# Patient Record
Sex: MALE | Race: OTHER | HISPANIC OR LATINO | URBAN - METROPOLITAN AREA
[De-identification: names, ages, dates, MRNs, and addresses within clinical notes are randomized per-mention and may not be internally consistent; named-entity substitution may affect disease eponyms.]

---

## 2019-05-14 ENCOUNTER — EMERGENCY (EMERGENCY)
Facility: HOSPITAL | Age: 29
LOS: 0 days | Discharge: HOME | End: 2019-05-14
Admitting: EMERGENCY MEDICINE
Payer: SELF-PAY

## 2019-05-14 VITALS
TEMPERATURE: 98 F | DIASTOLIC BLOOD PRESSURE: 67 MMHG | RESPIRATION RATE: 18 BRPM | SYSTOLIC BLOOD PRESSURE: 138 MMHG | OXYGEN SATURATION: 98 % | HEART RATE: 73 BPM

## 2019-05-14 DIAGNOSIS — M54.5 LOW BACK PAIN: ICD-10-CM

## 2019-05-14 PROCEDURE — 99283 EMERGENCY DEPT VISIT LOW MDM: CPT

## 2019-05-14 RX ORDER — IBUPROFEN 200 MG
600 TABLET ORAL ONCE
Refills: 0 | Status: COMPLETED | OUTPATIENT
Start: 2019-05-14 | End: 2019-05-14

## 2019-05-14 RX ORDER — METHOCARBAMOL 500 MG/1
1 TABLET, FILM COATED ORAL
Qty: 21 | Refills: 0
Start: 2019-05-14 | End: 2019-05-20

## 2019-05-14 RX ORDER — IBUPROFEN 200 MG
1 TABLET ORAL
Qty: 21 | Refills: 0
Start: 2019-05-14 | End: 2019-05-20

## 2019-05-14 RX ADMIN — Medication 600 MILLIGRAM(S): at 18:31

## 2019-05-14 NOTE — ED PROVIDER NOTE - PHYSICAL EXAMINATION
Gen: Alert, NAD, well appearing  Head: NC, AT, PERRL, EOMI, normal lids/conjunctiva  Neck: +supple, no tenderness/meningismus,  Pulm: Bilateral BS, normal resp effort, no wheeze/stridor/retractions  CV: RRR, no murmer  Abd: soft, NT/ND  Mskel: NT to palpation of back. +pain to left lower back with movement and changes in positions. +SLR on left leg. n/v intact. Normal gait in ED. No edema/erythema/cyanosis  Skin: no rash, warm/dry  Neuro: AAOx3, no sensory/motor deficits

## 2019-05-14 NOTE — ED PROVIDER NOTE - NSFOLLOWUPCLINICS_GEN_ALL_ED_FT
[facility] ([facility])  Rehabilitation  [facility] [address], [address] [place] [address]  Phone: [phone]  Fax:   Follow Up Time: 1-3 Days

## 2019-05-14 NOTE — ED PROVIDER NOTE - OBJECTIVE STATEMENT
29 yo M c/o waking up with left lower back pain today. Pain is "8", constant, worse with movement and changes in positions. Patient admits to lifting a lot while at work on Sunday. No abdominal pains, hematuria, dysuria, incontinence, weakness or numbness to legs.

## 2019-05-14 NOTE — ED PROVIDER NOTE - NS ED ROS FT
Review of Systems    Constitutional: (-) fever, (-) chills  Cardiovascular: (-) chest pain, (-) syncope  Respiratory: (-) cough, (-) shortness of breath  Gastrointestinal: (-) pain, (-) nausea, (-) vomiting, (-) diarrhea  Musculoskeletal: (-) neck pain, (+) back pain, (-) joint pain  Integumentary: (-) rash, (-) edema  Neurological: (-) headache, (-) altered mental status

## 2020-07-22 NOTE — ED ADULT TRIAGE NOTE - PRO INTERPRETER NEED 2
English
bed mobility training/strengthening/GOAL: Pt will be able to Negotiate up/down 10 steps, independently, w/ unilateral rail/and appropriate assistive device, w/reciprocal/step-to gait pattern, in 2 weeks./gait training/transfer training

## 2021-02-02 ENCOUNTER — EMERGENCY (EMERGENCY)
Facility: HOSPITAL | Age: 31
LOS: 0 days | Discharge: HOME | End: 2021-02-02
Attending: EMERGENCY MEDICINE | Admitting: EMERGENCY MEDICINE
Payer: MEDICAID

## 2021-02-02 VITALS
TEMPERATURE: 98 F | OXYGEN SATURATION: 100 % | DIASTOLIC BLOOD PRESSURE: 84 MMHG | SYSTOLIC BLOOD PRESSURE: 138 MMHG | HEART RATE: 85 BPM | RESPIRATION RATE: 18 BRPM

## 2021-02-02 DIAGNOSIS — R21 RASH AND OTHER NONSPECIFIC SKIN ERUPTION: ICD-10-CM

## 2021-02-02 DIAGNOSIS — R00.0 TACHYCARDIA, UNSPECIFIED: ICD-10-CM

## 2021-02-02 LAB
ALBUMIN SERPL ELPH-MCNC: 4.7 G/DL — SIGNIFICANT CHANGE UP (ref 3.5–5.2)
ALP SERPL-CCNC: 97 U/L — SIGNIFICANT CHANGE UP (ref 30–115)
ALT FLD-CCNC: 48 U/L — HIGH (ref 0–41)
AMYLASE P1 CFR SERPL: 43 U/L — SIGNIFICANT CHANGE UP (ref 25–115)
ANION GAP SERPL CALC-SCNC: 11 MMOL/L — SIGNIFICANT CHANGE UP (ref 7–14)
AST SERPL-CCNC: 26 U/L — SIGNIFICANT CHANGE UP (ref 0–41)
BASOPHILS # BLD AUTO: 0.03 K/UL — SIGNIFICANT CHANGE UP (ref 0–0.2)
BASOPHILS NFR BLD AUTO: 0.4 % — SIGNIFICANT CHANGE UP (ref 0–1)
BILIRUB SERPL-MCNC: 0.3 MG/DL — SIGNIFICANT CHANGE UP (ref 0.2–1.2)
BUN SERPL-MCNC: 11 MG/DL — SIGNIFICANT CHANGE UP (ref 10–20)
CALCIUM SERPL-MCNC: 9.4 MG/DL — SIGNIFICANT CHANGE UP (ref 8.5–10.1)
CHLORIDE SERPL-SCNC: 102 MMOL/L — SIGNIFICANT CHANGE UP (ref 98–110)
CO2 SERPL-SCNC: 28 MMOL/L — SIGNIFICANT CHANGE UP (ref 17–32)
CREAT SERPL-MCNC: 0.9 MG/DL — SIGNIFICANT CHANGE UP (ref 0.7–1.5)
EOSINOPHIL # BLD AUTO: 0.17 K/UL — SIGNIFICANT CHANGE UP (ref 0–0.7)
EOSINOPHIL NFR BLD AUTO: 2.1 % — SIGNIFICANT CHANGE UP (ref 0–8)
GLUCOSE SERPL-MCNC: 102 MG/DL — HIGH (ref 70–99)
HCT VFR BLD CALC: 46.2 % — SIGNIFICANT CHANGE UP (ref 42–52)
HGB BLD-MCNC: 15.6 G/DL — SIGNIFICANT CHANGE UP (ref 14–18)
IMM GRANULOCYTES NFR BLD AUTO: 0.2 % — SIGNIFICANT CHANGE UP (ref 0.1–0.3)
LYMPHOCYTES # BLD AUTO: 1.85 K/UL — SIGNIFICANT CHANGE UP (ref 1.2–3.4)
LYMPHOCYTES # BLD AUTO: 22.3 % — SIGNIFICANT CHANGE UP (ref 20.5–51.1)
MCHC RBC-ENTMCNC: 29.4 PG — SIGNIFICANT CHANGE UP (ref 27–31)
MCHC RBC-ENTMCNC: 33.8 G/DL — SIGNIFICANT CHANGE UP (ref 32–37)
MCV RBC AUTO: 87 FL — SIGNIFICANT CHANGE UP (ref 80–94)
MONOCYTES # BLD AUTO: 0.51 K/UL — SIGNIFICANT CHANGE UP (ref 0.1–0.6)
MONOCYTES NFR BLD AUTO: 6.2 % — SIGNIFICANT CHANGE UP (ref 1.7–9.3)
NEUTROPHILS # BLD AUTO: 5.7 K/UL — SIGNIFICANT CHANGE UP (ref 1.4–6.5)
NEUTROPHILS NFR BLD AUTO: 68.8 % — SIGNIFICANT CHANGE UP (ref 42.2–75.2)
NRBC # BLD: 0 /100 WBCS — SIGNIFICANT CHANGE UP (ref 0–0)
PLATELET # BLD AUTO: 245 K/UL — SIGNIFICANT CHANGE UP (ref 130–400)
POTASSIUM SERPL-MCNC: 4.4 MMOL/L — SIGNIFICANT CHANGE UP (ref 3.5–5)
POTASSIUM SERPL-SCNC: 4.4 MMOL/L — SIGNIFICANT CHANGE UP (ref 3.5–5)
PROT SERPL-MCNC: 7.2 G/DL — SIGNIFICANT CHANGE UP (ref 6–8)
RBC # BLD: 5.31 M/UL — SIGNIFICANT CHANGE UP (ref 4.7–6.1)
RBC # FLD: 12 % — SIGNIFICANT CHANGE UP (ref 11.5–14.5)
SODIUM SERPL-SCNC: 141 MMOL/L — SIGNIFICANT CHANGE UP (ref 135–146)
WBC # BLD: 8.28 K/UL — SIGNIFICANT CHANGE UP (ref 4.8–10.8)
WBC # FLD AUTO: 8.28 K/UL — SIGNIFICANT CHANGE UP (ref 4.8–10.8)

## 2021-02-02 PROCEDURE — 99284 EMERGENCY DEPT VISIT MOD MDM: CPT

## 2021-02-02 NOTE — ED PROVIDER NOTE - NS ED ROS FT
Constitutional: (-) fever  Eyes/ENT: (-) visual changes   Cardiovascular: (-) chest pain, (-) syncope  Respiratory: (-) cough, (-) shortness of breath  Gastrointestinal: (-) vomiting, (-) diarrhea  Genitourinary: (-) dysuria, (-) hesitancy, (-) frequency   Musculoskeletal: (-) neck pain, (-) back pain, (-) joint pain  Integumentary: (-) rash, (+) edema  Neurological: (-) headache, (-) altered mental status  Allergic/Immunologic: (-) pruritus

## 2021-02-02 NOTE — ED PROVIDER NOTE - NSFOLLOWUPINSTRUCTIONS_ED_ALL_ED_FT
Rash    CAN USE BENADRYL 25 MG EVERY 6 HOURS FOR ITCHING, RASH, SWELLING. CAN APPLY HYDROCORTISONE TO THE FOREHEAD RASH    A rash is a change in the color of the skin. A rash can also change the way your skin feels. There are many different conditions and factors that can cause a rash.    Follow these instructions at home:  Pay attention to any changes in your symptoms. Follow these instructions to help with your condition:    Medicine     Take or apply over-the-counter and prescription medicines only as told by your doctor. These may include:    Corticosteroid cream.  Anti-itch lotions.  Oral antihistamines.    Skin Care     Put cool compresses on the affected areas.  Try taking a bath with:    Epsom salts. Follow the instructions on the packaging. You can get these at your local pharmacy or grocery store.  Baking soda. Pour a small amount into the bath as told by your doctor.  Colloidal oatmeal. Follow the instructions on the packaging. You can get this at your local pharmacy or grocery store.    Try putting baking soda paste onto your skin. Stir water into baking soda until it gets like a paste.  Do not scratch or rub your skin.  Avoid covering the rash. Make sure the rash is exposed to air as much as possible.  General instructions     Avoid hot showers or baths, which can make itching worse. A cold shower may help.  Avoid scented soaps, detergents, and perfumes. Use gentle soaps, detergents, perfumes, and other cosmetic products.  Avoid anything that causes your rash. Keep a journal to help track what causes your rash. Write down:    What you eat.  What cosmetic products you use.  What you drink.  What you wear. This includes jewelry.    Keep all follow-up visits as told by your doctor. This is important.  Contact a doctor if:  You sweat at night.  You lose weight.  You pee (urinate) more than normal.  You feel weak.  You throw up (vomit).  Your skin or the whites of your eyes look yellow (jaundice).  Your skin:    Tingles.  Is numb.    Your rash:    Does not go away after a few days.  Gets worse.    You are:    More thirsty than normal.  More tired than normal.    You have:    New symptoms.  Pain in your belly (abdomen).  A fever.  Watery poop (diarrhea).    Get help right away if:  Your rash covers all or most of your body. The rash may or may not be painful.  You have blisters that:    Are on top of the rash.  Grow larger.  Grow together.  Are painful.  Are inside your nose or mouth.    You have a rash that:    Looks like purple pinprick-sized spots all over your body.  Has a “bull's eye” or looks like a target.  Is red and painful, causes your skin to peel, and is not from being in the sun too long.    This information is not intended to replace advice given to you by your health care provider. Make sure you discuss any questions you have with your health care provider.    Document Released: 06/05/2009 Document Revised: 05/25/2017 Document Reviewed: 05/04/2016  [employer] Interactive Patient Education © 2019 [employer]

## 2021-02-02 NOTE — ED PROVIDER NOTE - PATIENT PORTAL LINK FT
You can access the FollowMyHealth Patient Portal offered by [facility] by registering at the following website: [email or web address]. By joining [facility]’s FollowMyHealth portal, you will also be able to view your health information using other applications (apps) compatible with our system.

## 2021-02-02 NOTE — ED PROVIDER NOTE - CLINICAL SUMMARY MEDICAL DECISION MAKING FREE TEXT BOX
1 yo male with no PMH presents to the ER for swelling to right side of face and a localized swollen rash to the forehead since yesterday. States he noticed some forehead discomfort yesterday which then formed a small circular maculopapular rash about 2.5 cm in diameter. Pt then noticed  today redness and swelling to the lateral aspect of right face, anterior to the right ear area. Denies dental pain, no gum tenderness or swelling, no throat pain or itching, no SOB/CP/abdomen pain/N/V/D/Fever/chills. No rash to any other part of his body. Denies itching. No ear pain, no visual complaints, no neck pain/stiffness/numbness/weakness/back pain/leg swelling/wt loss. On exam pt has +small red maculopapular rash to the forehead (contact dermatitis?, allergic reaction?, ) and swelling/redness/pain to the right lateral face near parotid gland area. Pt eyes are clear, TMs clear, oropharynx clear no swelling or redness, rash to forehead, swelling to right cheek as described, no dental abscess or gingival edema/redness, no tenderness to percussion of the tooth, no cervical lymphadenopathy, no thyroid mass, Lungs ctab, heart regular s1s2, Abdomen soft nt/nd +BS, no masses, Ext no edema or calf tenderness, Neuro intact   Check cbc, check amylase, if neg, dc with referral to derm, take benadryl prn, and hydrocortisone cream for forehead rash

## 2021-02-02 NOTE — ED PROVIDER NOTE - PHYSICAL EXAMINATION
Gen: NAD, AOx3  Head: NCAT  HEENT: PERRL, oral mucosa moist, normal conjunctiva, oropharynx clear without exudate or erythema, R ear- erythema to canal w/ clear TM, edema to cheek w/ no TTP   Lung: CTAB, no respiratory distress, no wheezing, rales, rhonchi  CV: normal s1/s2, rrr, Normal perfusion, pulses 2+ throughout  Abd: soft, NTND, no CVA tenderness  MSK: No edema, no visible deformities, full range of motion in all 4 extremities  Neuro: No focal neurologic deficits  Skin: No rash   Psych: normal affect

## 2021-02-02 NOTE — ED PROVIDER NOTE - OBJECTIVE STATEMENT
29 yo male with no pertinent pmh presents c/o R facial edema for one day. 29 yo male with no pertinent pmh presents c/o R facial edema for one day. pt notes mild tenderness to edema and denies any hearing changes/cough/ear pain/visual changes. pt also mentions a lesion to his R forehead noted 3 days prior. denies any other symptoms including fevers, chill, headache, recent illness/travel, cough, abdominal pain, chest pain, or SOB.

## 2021-02-02 NOTE — ED PROVIDER NOTE - CARE PROVIDER_API CALL
Alex Coreas)  Dermatology; Internal Medicine  [address] [place] [address]  Phone: [phone]  Fax: [phone]  Follow Up Time: 1-3 Days

## 2023-01-31 ENCOUNTER — INPATIENT (INPATIENT)
Facility: HOSPITAL | Age: 33
LOS: 3 days | Discharge: HOME | End: 2023-02-04
Attending: INTERNAL MEDICINE | Admitting: INTERNAL MEDICINE
Payer: MEDICAID

## 2023-01-31 VITALS
SYSTOLIC BLOOD PRESSURE: 120 MMHG | OXYGEN SATURATION: 99 % | TEMPERATURE: 98 F | RESPIRATION RATE: 20 BRPM | DIASTOLIC BLOOD PRESSURE: 74 MMHG | HEART RATE: 133 BPM

## 2023-01-31 DIAGNOSIS — Z90.49 ACQUIRED ABSENCE OF OTHER SPECIFIED PARTS OF DIGESTIVE TRACT: Chronic | ICD-10-CM

## 2023-01-31 LAB
ALBUMIN SERPL ELPH-MCNC: 4.4 G/DL — SIGNIFICANT CHANGE UP (ref 3.5–5.2)
ALP SERPL-CCNC: 123 U/L — HIGH (ref 30–115)
ALT FLD-CCNC: 28 U/L — SIGNIFICANT CHANGE UP (ref 0–41)
ANION GAP SERPL CALC-SCNC: 10 MMOL/L — SIGNIFICANT CHANGE UP (ref 7–14)
ANION GAP SERPL CALC-SCNC: 8 MMOL/L — SIGNIFICANT CHANGE UP (ref 7–14)
AST SERPL-CCNC: 42 U/L — HIGH (ref 0–41)
BASOPHILS # BLD AUTO: 0.02 K/UL — SIGNIFICANT CHANGE UP (ref 0–0.2)
BASOPHILS NFR BLD AUTO: 0.2 % — SIGNIFICANT CHANGE UP (ref 0–1)
BILIRUB SERPL-MCNC: 1.2 MG/DL — SIGNIFICANT CHANGE UP (ref 0.2–1.2)
BUN SERPL-MCNC: 10 MG/DL — SIGNIFICANT CHANGE UP (ref 10–20)
BUN SERPL-MCNC: 11 MG/DL — SIGNIFICANT CHANGE UP (ref 10–20)
CALCIUM SERPL-MCNC: 9 MG/DL — SIGNIFICANT CHANGE UP (ref 8.4–10.5)
CALCIUM SERPL-MCNC: 9.6 MG/DL — SIGNIFICANT CHANGE UP (ref 8.4–10.5)
CHLORIDE SERPL-SCNC: 101 MMOL/L — SIGNIFICANT CHANGE UP (ref 98–110)
CHLORIDE SERPL-SCNC: 102 MMOL/L — SIGNIFICANT CHANGE UP (ref 98–110)
CO2 SERPL-SCNC: 26 MMOL/L — SIGNIFICANT CHANGE UP (ref 17–32)
CO2 SERPL-SCNC: 27 MMOL/L — SIGNIFICANT CHANGE UP (ref 17–32)
CREAT SERPL-MCNC: 1 MG/DL — SIGNIFICANT CHANGE UP (ref 0.7–1.5)
CREAT SERPL-MCNC: 1 MG/DL — SIGNIFICANT CHANGE UP (ref 0.7–1.5)
D DIMER BLD IA.RAPID-MCNC: 293 NG/ML DDU — HIGH
EGFR: 102 ML/MIN/1.73M2 — SIGNIFICANT CHANGE UP
EGFR: 102 ML/MIN/1.73M2 — SIGNIFICANT CHANGE UP
EOSINOPHIL # BLD AUTO: 0.04 K/UL — SIGNIFICANT CHANGE UP (ref 0–0.7)
EOSINOPHIL NFR BLD AUTO: 0.4 % — SIGNIFICANT CHANGE UP (ref 0–8)
GLUCOSE SERPL-MCNC: 110 MG/DL — HIGH (ref 70–99)
GLUCOSE SERPL-MCNC: 95 MG/DL — SIGNIFICANT CHANGE UP (ref 70–99)
HCT VFR BLD CALC: 44.4 % — SIGNIFICANT CHANGE UP (ref 42–52)
HGB BLD-MCNC: 14.6 G/DL — SIGNIFICANT CHANGE UP (ref 14–18)
IMM GRANULOCYTES NFR BLD AUTO: 0.2 % — SIGNIFICANT CHANGE UP (ref 0.1–0.3)
LYMPHOCYTES # BLD AUTO: 1.06 K/UL — LOW (ref 1.2–3.4)
LYMPHOCYTES # BLD AUTO: 11 % — LOW (ref 20.5–51.1)
MAGNESIUM SERPL-MCNC: 2.1 MG/DL — SIGNIFICANT CHANGE UP (ref 1.8–2.4)
MCHC RBC-ENTMCNC: 27.6 PG — SIGNIFICANT CHANGE UP (ref 27–31)
MCHC RBC-ENTMCNC: 32.9 G/DL — SIGNIFICANT CHANGE UP (ref 32–37)
MCV RBC AUTO: 83.9 FL — SIGNIFICANT CHANGE UP (ref 80–94)
MONOCYTES # BLD AUTO: 0.84 K/UL — HIGH (ref 0.1–0.6)
MONOCYTES NFR BLD AUTO: 8.7 % — SIGNIFICANT CHANGE UP (ref 1.7–9.3)
NEUTROPHILS # BLD AUTO: 7.63 K/UL — HIGH (ref 1.4–6.5)
NEUTROPHILS NFR BLD AUTO: 79.5 % — HIGH (ref 42.2–75.2)
NRBC # BLD: 0 /100 WBCS — SIGNIFICANT CHANGE UP (ref 0–0)
PLATELET # BLD AUTO: 251 K/UL — SIGNIFICANT CHANGE UP (ref 130–400)
POTASSIUM SERPL-MCNC: 4.1 MMOL/L — SIGNIFICANT CHANGE UP (ref 3.5–5)
POTASSIUM SERPL-MCNC: 5.8 MMOL/L — HIGH (ref 3.5–5)
POTASSIUM SERPL-SCNC: 4.1 MMOL/L — SIGNIFICANT CHANGE UP (ref 3.5–5)
POTASSIUM SERPL-SCNC: 5.8 MMOL/L — HIGH (ref 3.5–5)
PROT SERPL-MCNC: 7.4 G/DL — SIGNIFICANT CHANGE UP (ref 6–8)
RBC # BLD: 5.29 M/UL — SIGNIFICANT CHANGE UP (ref 4.7–6.1)
RBC # FLD: 13.2 % — SIGNIFICANT CHANGE UP (ref 11.5–14.5)
SODIUM SERPL-SCNC: 137 MMOL/L — SIGNIFICANT CHANGE UP (ref 135–146)
SODIUM SERPL-SCNC: 137 MMOL/L — SIGNIFICANT CHANGE UP (ref 135–146)
TROPONIN T SERPL-MCNC: <0.01 NG/ML — SIGNIFICANT CHANGE UP
WBC # BLD: 9.61 K/UL — SIGNIFICANT CHANGE UP (ref 4.8–10.8)
WBC # FLD AUTO: 9.61 K/UL — SIGNIFICANT CHANGE UP (ref 4.8–10.8)

## 2023-01-31 PROCEDURE — 71275 CT ANGIOGRAPHY CHEST: CPT | Mod: 26,MA

## 2023-01-31 PROCEDURE — 99285 EMERGENCY DEPT VISIT HI MDM: CPT

## 2023-01-31 PROCEDURE — 93010 ELECTROCARDIOGRAM REPORT: CPT | Mod: 77

## 2023-01-31 PROCEDURE — 93010 ELECTROCARDIOGRAM REPORT: CPT

## 2023-01-31 PROCEDURE — 71045 X-RAY EXAM CHEST 1 VIEW: CPT | Mod: 26

## 2023-01-31 RX ORDER — DILTIAZEM HCL 120 MG
30 CAPSULE, EXT RELEASE 24 HR ORAL EVERY 6 HOURS
Refills: 0 | Status: DISCONTINUED | OUTPATIENT
Start: 2023-01-31 | End: 2023-02-01

## 2023-01-31 RX ORDER — IBUPROFEN 200 MG
600 TABLET ORAL ONCE
Refills: 0 | Status: COMPLETED | OUTPATIENT
Start: 2023-01-31 | End: 2023-01-31

## 2023-01-31 RX ORDER — ACETAMINOPHEN 500 MG
650 TABLET ORAL EVERY 6 HOURS
Refills: 0 | Status: DISCONTINUED | OUTPATIENT
Start: 2023-01-31 | End: 2023-02-04

## 2023-01-31 RX ORDER — SODIUM CHLORIDE 9 MG/ML
1000 INJECTION, SOLUTION INTRAVENOUS ONCE
Refills: 0 | Status: COMPLETED | OUTPATIENT
Start: 2023-01-31 | End: 2023-01-31

## 2023-01-31 RX ORDER — ENOXAPARIN SODIUM 100 MG/ML
130 INJECTION SUBCUTANEOUS EVERY 12 HOURS
Refills: 0 | Status: DISCONTINUED | OUTPATIENT
Start: 2023-02-01 | End: 2023-02-01

## 2023-01-31 RX ORDER — ENOXAPARIN SODIUM 100 MG/ML
110 INJECTION SUBCUTANEOUS ONCE
Refills: 0 | Status: DISCONTINUED | OUTPATIENT
Start: 2023-01-31 | End: 2023-02-01

## 2023-01-31 RX ORDER — DILTIAZEM HCL 120 MG
15 CAPSULE, EXT RELEASE 24 HR ORAL ONCE
Refills: 0 | Status: COMPLETED | OUTPATIENT
Start: 2023-01-31 | End: 2023-01-31

## 2023-01-31 RX ADMIN — SODIUM CHLORIDE 1000 MILLILITER(S): 9 INJECTION, SOLUTION INTRAVENOUS at 13:45

## 2023-01-31 RX ADMIN — Medication 30 MILLIGRAM(S): at 20:00

## 2023-01-31 RX ADMIN — SODIUM CHLORIDE 1000 MILLILITER(S): 9 INJECTION, SOLUTION INTRAVENOUS at 16:57

## 2023-01-31 RX ADMIN — Medication 15 MILLIGRAM(S): at 19:29

## 2023-01-31 NOTE — H&P ADULT - NSICDXFAMILYHX_GEN_ALL_CORE_FT
FAMILY HISTORY:  Father  Still living? No  FH: ALS (amyotrophic lateral sclerosis), Age at diagnosis: Age Unknown    Sibling  Still living? No  FH: ALS (amyotrophic lateral sclerosis), Age at diagnosis: Age Unknown

## 2023-01-31 NOTE — ED PROVIDER NOTE - CLINICAL SUMMARY MEDICAL DECISION MAKING FREE TEXT BOX
Patient signed out to me from Dr. Alcocer, 32-year-old man with no significant PMHx, in the ER for evaluation after episode of lightheadedness, diaphoresis, chest discomfort this morning.  Patient asymptomatic in ER.  Patient tachycardic in ER, 's.  Initial EKG appeared to be sinus tach.  Labs reviewed: Troponin negative, + elevated D-dimer.  CTA chest negative for PE.  Patient admitted to telemetry for continued monitoring/cardiac evaluation.  Repeat EKG while patient still in ER showed a flutter @ 138.  Patient given Cardizem and Lovenox.  Case discussed with cardiology fellow, will come and eval. MAR aware as well.

## 2023-01-31 NOTE — ED PROVIDER NOTE - ATTENDING APP SHARED VISIT CONTRIBUTION OF CARE
32-year-old male without significant past medical history now presents with status post syncope at work.  As per patient his coworkers that he told him that he turned red and then pale and was very diaphoretic.  Patient denies any shortness of breath or chest pain no back pain or abdominal pain at this time.  No recent travel.    vss, tachycardic, no respiratory distress, no diaphoresis at this time nontoxic, well appearing, pink conj, anicteric, MMM, neck supple, CTAB, RRR, equal radial pulses bilat, abd soft/nt/nd, no cva tend. no calves tend, no edema, no fnd. no rashes.

## 2023-01-31 NOTE — H&P ADULT - HISTORY OF PRESENT ILLNESS
32M w/ no known PMH presenting s/p syncopal episode. Patient was in his usual state of health until earlier today when he suddenly developed lightheadedness, diaphoresis, sub-sternal chest discomfort, and generalized weakness. Symptoms have been intermittently occurring throughout day. No known aggravating or alleviating factors. Has never experienced anything similar before. No reported fevers, abdominal pain, n/v/d, dysuria, or LE swelling. No recent illnesses, sick contacts, changes in medications, or illicit/recreational drug use.    In ED, pt tachycardic (133) but otherwise HD stable on vitals. Labs showed D-Dimer 293 and initial troponin negative. EKG shows no acute ischemic changes. EKG officially read as NSR, but appears to be aflutter. CXR shows no acute cardiopulmonary disease. CTA Chest shows no acute PE. S/P 2L LR bolus, diltiazem 15mg IV x1, and Lovenox 110mg SQ x1. On telemetry, HR slowed down to 90's after cardizem and underlying aflutter became more obvious. Subsequently admitted to telemetry for new onset aflutter. 32M w/ no known PMH presenting s/p pre-syncopal episode. Patient was in his usual state of health until earlier today when he suddenly developed lightheadedness, diaphoresis, sub-sternal chest discomfort, and generalized weakness. No reported LOC or HT. Episode lasted for a few minutes and then spontaneously resolved, but recurrent episodes intermittently occurring throughout day. No known aggravating or alleviating factors. Has never experienced anything similar before. No reported fevers, abdominal pain, n/v/d, dysuria, or LE swelling. No recent illnesses, sick contacts, changes in medications, or illicit/recreational drug use.    In ED, pt tachycardic (133) but otherwise HD stable on vitals. Labs showed D-Dimer 293 and initial troponin negative. EKG shows no acute ischemic changes. EKG officially read as NSR, but appears to be aflutter. CXR shows no acute cardiopulmonary disease. CTA Chest shows no acute PE. S/P 2L LR bolus, diltiazem 15mg IV x1, and Lovenox 110mg SQ x1. On telemetry, HR slowed down to 90's after diltiazem and underlying aflutter became more obvious. Subsequently admitted to telemetry for new onset aflutter.

## 2023-01-31 NOTE — H&P ADULT - ASSESSMENT
32M w/ no known PMH presenting s/p syncopal episode and found to be persistently tachycardic. Admitted to telemetry for new onset aflutter.    #Chronic Atrial Flutter w/ RVR  Initially p/w pre-syncope. Tachycardic on admission ('s). Orthostatic vitals negative. D-Dimer 293 and Tn neg x2. No acute ischemic changes on EKG, but did show aflutter and possible q waves in inferior leads. CHADS-VASC >0.  - c/w diltiazem 30mg PO QID (titrate up by HR)  - c/w Lovenox 130mg SQ BID (can likely hold though given CHADS-VASC 0)  - admit to telemetry  - repeat EKG in AM  - repeat Tn in AM (Tn neg x2)  - check lipid panel, A1c, and TSH  - obtain TTE  - cardiology consulted; f/u recs     DVT PPX: Lovenox 130mg SQ BID  GI PPX: none indicated  DIET: Regular  ACTIVITY: IAT  CODE STATUS: Full Code  DISPOSITION: From Home    PENDING: cardiology consult 32M w/ no known PMH presenting s/p syncopal episode and found to be persistently tachycardic. Admitted to telemetry for new onset aflutter.    #Atrial Flutter w/ RVR, new onset  #ACS, r/o  Initially p/w pre-syncope. Tachycardic on admission ('s). Orthostatic vitals negative. D-Dimer 293 and Tn neg x2. No acute ischemic changes on EKG, but did show aflutter and possible q waves in inferior leads. CHADS-VASC >0. Will perform ACS w/u given pt's young age and possible presence of q waves on EKG.  - c/w diltiazem 30mg PO QID (titrate up by HR)  - c/w Lovenox 130mg SQ BID (can likely hold though given CHADS-VASC 0)  - admit to telemetry  - repeat EKG in AM  - repeat Tn in AM (Tn neg x2)  - check lipid panel, A1c, and TSH  - obtain TTE  - cardiology consulted; f/u recs     DVT PPX: Lovenox 130mg SQ BID  GI PPX: none indicated  DIET: Regular  ACTIVITY: IAT  CODE STATUS: Full Code  DISPOSITION: From Home    PENDING: cardiology consult 32M w/ no known PMH presenting s/p syncopal episode and found to be persistently tachycardic. Admitted to telemetry for new onset aflutter.    #Atrial Flutter w/ RVR, new onset  #ACS, r/o  Initially p/w pre-syncope. Tachycardic on admission ('s). Orthostatic vitals negative. D-Dimer 293 and Tn neg x2. No acute ischemic changes on EKG, but did show aflutter and possible q waves in inferior leads. CHADS-VASC >0. Will perform ACS w/u given pt's young age and possible presence of q waves on EKG. However, likely secondary to underlying untreated JANELLE/OHS.  - c/w diltiazem 30mg PO QID (titrate up by HR)  - c/w Lovenox 130mg SQ BID (switch to DOAC on discharge)  - admit to telemetry  - repeat EKG in AM  - repeat Tn in AM (Tn neg x2)  - check lipid panel, A1c, and TSH  - obtain TTE  - NPO after midnight for GURINDER/DCCV  - cardiology consulted; f/u final recs  - EP consulted; f/u recs    DVT PPX: Lovenox 130mg SQ BID  GI PPX: none indicated  DIET: Regular; NPO after midnight  ACTIVITY: IAT  CODE STATUS: Full Code  DISPOSITION: From Home    PENDING: cardiology consult; EP consult

## 2023-01-31 NOTE — ED PROVIDER NOTE - CARE PLAN
1 Principal Discharge DX:	Chest pain  Secondary Diagnosis:	Tachycardia  Secondary Diagnosis:	Near syncope

## 2023-01-31 NOTE — ED PROVIDER NOTE - OBJECTIVE STATEMENT
32 year old male, no past medical history, who presents for eval. patient was @ work this morning/afternoon when he began feeling lightheaded with associated diaphoresis and chest discomfort. patient with intermittent episodes of sx since morning, resolution of symptoms since arrival to ed. denies fever, chills, shortness of breath, hemoptysis, vomiting/diarrhea, headache, syncope. non-smoker, no family hx cad. no recent travel/surgery or hx blood clots.

## 2023-01-31 NOTE — ED PROVIDER NOTE - PROGRESS NOTE DETAILS
Note authored by Dr. Alcocer: Signed out to Dr. Goodwin, follow-up labs, reassess, and dispo Ramos: spoke with cards regarding ekg changes, +aflutter given cardizem/lovenox. MAR aware and will continue to follow.

## 2023-01-31 NOTE — ED ADULT NURSE NOTE - OBJECTIVE STATEMENT
pt presents to the ED c/o near syncopal episode at work. pt states he felt diaphoretic and had chest tightness which is now resolved. pt is placed on cardiac monitor and tachy to 130s, provider aware. pt denies n/v, vision changes, sob

## 2023-01-31 NOTE — H&P ADULT - NSHPPHYSICALEXAM_GEN_ALL_CORE
VITALS  T(C): 36.4 (01-31-23 @ 15:53), Max: 36.5 (01-31-23 @ 12:09)  HR: 131 (01-31-23 @ 19:44) (129 - 133)  BP: 113/82 (01-31-23 @ 19:44) (113/82 - 138/92)  RR: 20 (01-31-23 @ 18:23) (20 - 20)  SpO2: 100% (01-31-23 @ 18:23) (99% - 100%)    PHYSICAL EXAM  GENERAL: NAD, well-developed  NEURO: AO x3, PERRLA, EOMI, motor strength intact in 4/4 extremities, sensation intact  HEAD:  Atraumatic, Normocephalic  EYES: conjunctiva and sclera clear  NECK: Supple, No JVD, no lymphadenopathy, no thyromegaly  CHEST/LUNG: Clear to auscultation bilaterally; No wheezes, rales or rhonchi  HEART: tachycardic; irregular  ABDOMEN: Soft, Nontender, Nondistended; Bowel sounds present, no masses.  EXTREMITIES:  2+ Peripheral Pulses, No clubbing, cyanosis, or edema  SKIN: Warm, dry, intact, no rashes or lesions  PSYCH: affect appropriate

## 2023-01-31 NOTE — H&P ADULT - NSHPLABSRESULTS_GEN_ALL_CORE
CT Angio Chest PE Protocol w/ IV Cont (01.31.23 @ 16:43)  No evidence of acute pulmonary embolus or other acute intrathoracic pathology

## 2023-02-01 LAB
A1C WITH ESTIMATED AVERAGE GLUCOSE RESULT: 5.9 % — HIGH (ref 4–5.6)
ALBUMIN SERPL ELPH-MCNC: 4.5 G/DL — SIGNIFICANT CHANGE UP (ref 3.5–5.2)
ALP SERPL-CCNC: 123 U/L — HIGH (ref 30–115)
ALT FLD-CCNC: 22 U/L — SIGNIFICANT CHANGE UP (ref 0–41)
AMPHET UR-MCNC: NEGATIVE — SIGNIFICANT CHANGE UP
ANION GAP SERPL CALC-SCNC: 13 MMOL/L — SIGNIFICANT CHANGE UP (ref 7–14)
AST SERPL-CCNC: 17 U/L — SIGNIFICANT CHANGE UP (ref 0–41)
BARBITURATES UR SCN-MCNC: NEGATIVE — SIGNIFICANT CHANGE UP
BASOPHILS # BLD AUTO: 0.03 K/UL — SIGNIFICANT CHANGE UP (ref 0–0.2)
BASOPHILS NFR BLD AUTO: 0.4 % — SIGNIFICANT CHANGE UP (ref 0–1)
BENZODIAZ UR-MCNC: NEGATIVE — SIGNIFICANT CHANGE UP
BILIRUB SERPL-MCNC: 1.3 MG/DL — HIGH (ref 0.2–1.2)
BUN SERPL-MCNC: 9 MG/DL — LOW (ref 10–20)
CALCIUM SERPL-MCNC: 9.7 MG/DL — SIGNIFICANT CHANGE UP (ref 8.4–10.5)
CHLORIDE SERPL-SCNC: 105 MMOL/L — SIGNIFICANT CHANGE UP (ref 98–110)
CHOLEST SERPL-MCNC: 188 MG/DL — SIGNIFICANT CHANGE UP
CO2 SERPL-SCNC: 24 MMOL/L — SIGNIFICANT CHANGE UP (ref 17–32)
COCAINE METAB.OTHER UR-MCNC: NEGATIVE — SIGNIFICANT CHANGE UP
CREAT SERPL-MCNC: 1 MG/DL — SIGNIFICANT CHANGE UP (ref 0.7–1.5)
DRUG SCREEN 1, URINE RESULT: SIGNIFICANT CHANGE UP
EGFR: 102 ML/MIN/1.73M2 — SIGNIFICANT CHANGE UP
EOSINOPHIL # BLD AUTO: 0.16 K/UL — SIGNIFICANT CHANGE UP (ref 0–0.7)
EOSINOPHIL NFR BLD AUTO: 2 % — SIGNIFICANT CHANGE UP (ref 0–8)
ESTIMATED AVERAGE GLUCOSE: 123 MG/DL — HIGH (ref 68–114)
FENTANYL UR QL: NEGATIVE — SIGNIFICANT CHANGE UP
GLUCOSE SERPL-MCNC: 102 MG/DL — HIGH (ref 70–99)
HCT VFR BLD CALC: 47.3 % — SIGNIFICANT CHANGE UP (ref 42–52)
HDLC SERPL-MCNC: 42 MG/DL — SIGNIFICANT CHANGE UP
HGB BLD-MCNC: 15.4 G/DL — SIGNIFICANT CHANGE UP (ref 14–18)
IMM GRANULOCYTES NFR BLD AUTO: 0.3 % — SIGNIFICANT CHANGE UP (ref 0.1–0.3)
LIPID PNL WITH DIRECT LDL SERPL: 126 MG/DL — HIGH
LYMPHOCYTES # BLD AUTO: 2.05 K/UL — SIGNIFICANT CHANGE UP (ref 1.2–3.4)
LYMPHOCYTES # BLD AUTO: 25.9 % — SIGNIFICANT CHANGE UP (ref 20.5–51.1)
MAGNESIUM SERPL-MCNC: 2.1 MG/DL — SIGNIFICANT CHANGE UP (ref 1.8–2.4)
MCHC RBC-ENTMCNC: 27.6 PG — SIGNIFICANT CHANGE UP (ref 27–31)
MCHC RBC-ENTMCNC: 32.6 G/DL — SIGNIFICANT CHANGE UP (ref 32–37)
MCV RBC AUTO: 84.8 FL — SIGNIFICANT CHANGE UP (ref 80–94)
METHADONE UR-MCNC: NEGATIVE — SIGNIFICANT CHANGE UP
MONOCYTES # BLD AUTO: 0.61 K/UL — HIGH (ref 0.1–0.6)
MONOCYTES NFR BLD AUTO: 7.7 % — SIGNIFICANT CHANGE UP (ref 1.7–9.3)
NEUTROPHILS # BLD AUTO: 5.05 K/UL — SIGNIFICANT CHANGE UP (ref 1.4–6.5)
NEUTROPHILS NFR BLD AUTO: 63.7 % — SIGNIFICANT CHANGE UP (ref 42.2–75.2)
NON HDL CHOLESTEROL: 146 MG/DL — HIGH
NRBC # BLD: 0 /100 WBCS — SIGNIFICANT CHANGE UP (ref 0–0)
NT-PROBNP SERPL-SCNC: 2104 PG/ML — HIGH (ref 0–300)
OPIATES UR-MCNC: NEGATIVE — SIGNIFICANT CHANGE UP
OXYCODONE UR-MCNC: NEGATIVE — SIGNIFICANT CHANGE UP
PCP UR-MCNC: NEGATIVE — SIGNIFICANT CHANGE UP
PHOSPHATE SERPL-MCNC: 3.6 MG/DL — SIGNIFICANT CHANGE UP (ref 2.1–4.9)
PLATELET # BLD AUTO: 256 K/UL — SIGNIFICANT CHANGE UP (ref 130–400)
POTASSIUM SERPL-MCNC: 4.4 MMOL/L — SIGNIFICANT CHANGE UP (ref 3.5–5)
POTASSIUM SERPL-SCNC: 4.4 MMOL/L — SIGNIFICANT CHANGE UP (ref 3.5–5)
PROPOXYPHENE QUALITATIVE URINE RESULT: NEGATIVE — SIGNIFICANT CHANGE UP
PROT SERPL-MCNC: 7 G/DL — SIGNIFICANT CHANGE UP (ref 6–8)
RBC # BLD: 5.58 M/UL — SIGNIFICANT CHANGE UP (ref 4.7–6.1)
RBC # FLD: 13.4 % — SIGNIFICANT CHANGE UP (ref 11.5–14.5)
SARS-COV-2 RNA SPEC QL NAA+PROBE: SIGNIFICANT CHANGE UP
SODIUM SERPL-SCNC: 142 MMOL/L — SIGNIFICANT CHANGE UP (ref 135–146)
T4 FREE+ TSH PNL SERPL: 3.97 UIU/ML — SIGNIFICANT CHANGE UP (ref 0.27–4.2)
THC UR QL: NEGATIVE — SIGNIFICANT CHANGE UP
TRIGL SERPL-MCNC: 98 MG/DL — SIGNIFICANT CHANGE UP
TROPONIN T SERPL-MCNC: <0.01 NG/ML — SIGNIFICANT CHANGE UP
WBC # BLD: 7.92 K/UL — SIGNIFICANT CHANGE UP (ref 4.8–10.8)
WBC # FLD AUTO: 7.92 K/UL — SIGNIFICANT CHANGE UP (ref 4.8–10.8)

## 2023-02-01 PROCEDURE — 93320 DOPPLER ECHO COMPLETE: CPT | Mod: 26

## 2023-02-01 PROCEDURE — 93325 DOPPLER ECHO COLOR FLOW MAPG: CPT | Mod: 26

## 2023-02-01 PROCEDURE — 92960 CARDIOVERSION ELECTRIC EXT: CPT

## 2023-02-01 PROCEDURE — 93010 ELECTROCARDIOGRAM REPORT: CPT | Mod: 76

## 2023-02-01 PROCEDURE — 99222 1ST HOSP IP/OBS MODERATE 55: CPT

## 2023-02-01 PROCEDURE — 99223 1ST HOSP IP/OBS HIGH 75: CPT

## 2023-02-01 PROCEDURE — 99232 SBSQ HOSP IP/OBS MODERATE 35: CPT

## 2023-02-01 PROCEDURE — 93312 ECHO TRANSESOPHAGEAL: CPT | Mod: 26,XU

## 2023-02-01 RX ORDER — APIXABAN 2.5 MG/1
5 TABLET, FILM COATED ORAL EVERY 12 HOURS
Refills: 0 | Status: DISCONTINUED | OUTPATIENT
Start: 2023-02-01 | End: 2023-02-02

## 2023-02-01 RX ORDER — METOPROLOL TARTRATE 50 MG
25 TABLET ORAL
Refills: 0 | Status: DISCONTINUED | OUTPATIENT
Start: 2023-02-01 | End: 2023-02-04

## 2023-02-01 RX ADMIN — ENOXAPARIN SODIUM 130 MILLIGRAM(S): 100 INJECTION SUBCUTANEOUS at 05:09

## 2023-02-01 RX ADMIN — Medication 650 MILLIGRAM(S): at 05:11

## 2023-02-01 RX ADMIN — Medication 30 MILLIGRAM(S): at 00:46

## 2023-02-01 RX ADMIN — Medication 30 MILLIGRAM(S): at 05:09

## 2023-02-01 RX ADMIN — Medication 30 MILLIGRAM(S): at 11:41

## 2023-02-01 RX ADMIN — Medication 25 MILLIGRAM(S): at 18:02

## 2023-02-01 RX ADMIN — APIXABAN 5 MILLIGRAM(S): 2.5 TABLET, FILM COATED ORAL at 18:02

## 2023-02-01 NOTE — PROGRESS NOTE ADULT - SUBJECTIVE AND OBJECTIVE BOX
NICOLEJSESIE  32y, Male  Allergy: No Known Allergies    Hospital Day: 1d    Patient seen and examined earlier today.   no complaints, feeling some what better  cardiology following     PMH/PSH:  PAST MEDICAL & SURGICAL HISTORY:  No pertinent past medical history      S/P appendectomy          LAST 24-Hr EVENTS:    VITALS:  T(F): 98.1 (02-01-23 @ 08:05), Max: 98.1 (02-01-23 @ 08:05)  HR: 103 (02-01-23 @ 08:05)  BP: 139/93 (02-01-23 @ 08:05) (113/82 - 139/93)  RR: 20 (02-01-23 @ 08:05)  SpO2: 100% (02-01-23 @ 08:05)          TESTS & MEASUREMENTS:  Weight/BMI  133.8 (01-31-23 @ 18:23)                          15.4   7.92  )-----------( 256      ( 01 Feb 2023 07:48 )             47.3         02-01    142  |  105  |  9<L>  ----------------------------<  102<H>  4.4   |  24  |  1.0    Ca    9.7      01 Feb 2023 07:48  Phos  3.6     02-01  Mg     2.1     02-01    TPro  7.0  /  Alb  4.5  /  TBili  1.3<H>  /  DBili  x   /  AST  17  /  ALT  22  /  AlkPhos  123<H>  02-01    LIVER FUNCTIONS - ( 01 Feb 2023 07:48 )  Alb: 4.5 g/dL / Pro: 7.0 g/dL / ALK PHOS: 123 U/L / ALT: 22 U/L / AST: 17 U/L / GGT: x           CARDIAC MARKERS ( 01 Feb 2023 07:48 )  x     / <0.01 ng/mL / x     / x     / x      CARDIAC MARKERS ( 31 Jan 2023 21:52 )  x     / <0.01 ng/mL / x     / x     / x      CARDIAC MARKERS ( 31 Jan 2023 19:04 )  x     / <0.01 ng/mL / x     / x     / x      CARDIAC MARKERS ( 31 Jan 2023 14:45 )  x     / <0.01 ng/mL / x     / x     / x                D-Dimer Assay, Quantitative: 293 ng/mL DDU (01-31-23 @ 15:10)      Serum Pro-Brain Natriuretic Peptide: 2104 pg/mL (02-01-23 @ 07:48)    COVID-19 PCR: NotDetec (02-01-23 @ 07:40)        A1C with Estimated Average Glucose Result: 5.9 % (02-01-23 @ 07:48)          RADIOLOGY, ECG, & ADDITIONAL TESTS:  12 Lead ECG:   Ventricular Rate 138 BPM    Atrial Rate 276 BPM    QRS Duration 96 ms    Q-T Interval 378 ms    QTC Calculation(Bazett) 572 ms    P Axis 214 degrees    R Axis 67 degrees    T Axis 129 degrees    Diagnosis Line Atrial flutter with 2:1 A-V conduction  Marked ST abnormality, possible lateral subendocardial injury  Abnormal ECG    Confirmed by TRISHA DIAS [facility] ([id number]) on 2/1/2023 10:09:19 AM (01-31-23 @ 18:52)    CT Angio Chest PE Protocol w/ IV Cont:   ACC: [id number] EXAM:  CT ANGIO CHEST PULM [facility]   ORDERED BY: JOSEPH DE LA CRUZ     PROCEDURE DATE:  01/31/2023          INTERPRETATION:  CLINICAL INDICATION: Chest pain.    TECHNIQUE:  CTA of the thorax was performed after administration of   contrast per the PE protocol. Sagittal and coronal reformats were   performed as well as 3D reconstructions.  Intravenous contrast: 80 cc Omnipaque 350    COMPARISON: None.    INTERPRETATION:    PULMONARY ARTERIES: No central or segmental pulmonary emboli.    AIRWAYS/LUNGS/PLEURA: Mild bibasilar subsegmental atelectasis. No lobar   consolidation, pleural effusion or pneumothorax. Central tracheobronchial   tree is grossly patent.    MEDIASTINUM: Few subcentimeter and mildly enlarged mediastinal lymph   nodes are nonspecific.    HEART AND VESSELS: Heart size is within normal limits. Thoracic aorta and   pulmonary arteries are normal caliber. No pericardial effusion.    BONES AND SOFT TISSUES: Degenerative changes of the spine. Bilateral   gynecomastia.    IMPRESSION:    1. No evidence of acute pulmonary embolus or other acute intrathoracic   pathology.    --- End of Report ---          ABDI RICKETTS MD; Resident Radiologist  This document has been electronically signed.  SAGAR IQBAL MD; Attending Radiologist  This document has been electronically signed. Jan 31 2023  5:50PM (01-31-23 @ 16:43)    RECENT DIAGNOSTIC ORDERS:  Magnesium, Serum: AM Sched. Collection: 02-Feb-2023 04:30 (02-01-23 @ 09:21)  Comprehensive Metabolic Panel: AM Sched. Collection: 02-Feb-2023 04:30 (02-01-23 @ 09:21)  Complete Blood Count: AM Sched. Collection: 02-Feb-2023 04:30 (02-01-23 @ 09:21)  Diet, NPO:   NPO for Procedure/Test     NPO Start Date: 01-Feb-2023,   NPO Start Time: 05:00  Except Medications (02-01-23 @ 07:17)  TTE Echo Complete w/ Contrast w/ Doppler:   Transport: Stretcher-Crib  Monitor: w/o Monitor (01-31-23 @ 20:15)  Diet, Regular (01-31-23 @ 18:58)  Wet Read: Routine  WET READ: CXR negative - No pneumothorax, No opacities, No free air (01-31-23 @ 14:48)  Add On Test-Specimen in Lab: STAT, - d-dimer  Specimen In Lab. Enter name of test required in Special Instructions.  This is for informational purposes only and will not receive results. Laboratory staff will enter new orders for the requested add on test and that order will receive results. (01-31-23 @ 14:19)      MEDICATIONS:  MEDICATIONS  (STANDING):  diltiazem    Tablet 30 milliGRAM(s) Oral every 6 hours  enoxaparin Injectable 110 milliGRAM(s) SubCutaneous once  enoxaparin Injectable 130 milliGRAM(s) SubCutaneous every 12 hours    MEDICATIONS  (PRN):  acetaminophen     Tablet .. 650 milliGRAM(s) Oral every 6 hours PRN Temp greater or equal to 38C (100.4F), Mild Pain (1 - 3)      HOME MEDICATIONS:      PHYSICAL EXAM:  GENERAL: Patient lying on bed, comfortable   CHEST/LUNG: NVB, no wheezing   HEART: irregular   ABDOMEN: Soft. non tender, BS positive  EXTREMITIES:  no edema, Bruising  CNS: AAAx4. No cranial nerves deficit.

## 2023-02-01 NOTE — CONSULT NOTE ADULT - ASSESSMENT
32M w/ no known PMH presenting s/p pre-syncopal episode. Patient was found to be atrial flutter. patient admitted for further care and EPS consulted.      Impression   -Atrial flutter   -JANELLE non compliant with CPAP      Recommendations   -Telemetry monitor   -2d ECHO  -Mag above 2 and potassium above 4  -c/w cardizem 30mg q6 PO  -c/w Lovenox therapeutic dose for now, switch to Eliquis 5mg BID on discharge for 6 weeks   -GURINDER/ CV today   -F/u with EP as outpatient for ablation    32M w/ no known PMH presenting s/p pre-syncopal episode. Patient was found to be atrial flutter. patient admitted for further care and EPS consulted.      Impression   -Atrial flutter CHDASVASC of 0  -JANELLE non compliant with CPAP      Recommendations   -Telemetry monitor   -2d ECHO  -Mag above 2 and potassium above 4  -TSH  -c/w Cardizem 30mg q6 PO  -c/w Lovenox therapeutic dose for now, switch to Eliquis 5mg BID on discharge for 6 weeks   -GURINDER/ CV today   -F/u with EP as outpatient for ablation   -Counseled about [facility] compliance    32M w/ no known PMH presenting s/p pre-syncopal episode. Patient was found to be atrial flutter. patient admitted for further care and EPS consulted.      Impression   -Atrial flutter CHDASVASC of 0 s/p GURINDER and CV  -HFrEF per GURINDER, EF 20%  -JANELLE non compliant with CPAP      Recommendations   -Telemetry monitor   -2d ECHO after CV to reassess EF  -Mag above 2 and potassium above 4  -TSH  -c/w Cardizem 30mg q6 PO  -c/w Lovenox therapeutic dose for now, switch to Eliquis 5mg BID on discharge for 6 weeks   -Cardiology follow up   -F/u with EP as outpatient for ablation   -Counseled about cpap compliance    32M w/ no known PMH presenting s/p pre-syncopal episode. Patient was found to be atrial flutter. patient admitted for further care and EPS consulted.      Impression   -Atrial flutter CHDASVASC of 0 s/p GURINDER and CV  -HFrEF per GURINDER, EF 20%  -JANELLE non compliant with CPAP      Recommendations   -Telemetry monitor   -2d ECHO after CV to reassess EF  -Mag above 2 and potassium above 4  -TSH  -switch Cardizem to metoprolol succinate 25 daily given low EF  -c/w Lovenox therapeutic dose for now, switch to Eliquis 5mg BID on discharge for 6 weeks   -Cardiology follow up   -F/u with EP as outpatient for ablation   -Counseled about cpap compliance    32M w/ no known PMH presenting s/p pre-syncopal episode. Patient was found to be atrial flutter. patient admitted for further care and EPS consulted.      Impression   -Atrial flutter CHDASVASC of 1 s/p GURINDER and CV  -New onset Cardiomyopathy / HFrEF per GURINDER, EF 20%  -JANELLE non compliant with CPAP      Recommendations   -Telemetry monitor   -2d ECHO after CV to reassess EF  -CCTA to assess coronary arteries   -Mag above 2 and potassium above 4  -TSH  -switch Cardizem to metoprolol succinate 25 daily given low EF  - switch to Eliquis 5mg BID on discharge for 6 weeks   -Cardiology follow up   -F/u with EP as outpatient for ablation   -Counseled about cpap compliance    32M w/ no known PMH presenting s/p pre-syncopal episode. Patient was found to be atrial flutter. patient admitted for further care and EPS consulted.      Impression   -Atrial flutter CHDASVASC of 1 s/p GURINDER and CV  -New onset Cardiomyopathy / HFrEF per GURINDER, EF 20%  -JANELLE non compliant with CPAP      Recommendations   -Telemetry monitor   -2d ECHO after CV to reassess EF  -CCTA to assess coronary arteries   -Mag above 2 and potassium above 4  -TSH  -switch Cardizem to metoprolol BID vs TID to titrate HR around 60 for the CCTA  - switch to Eliquis 5mg BID on discharge for 6 weeks   -Cardiology follow up for GDMT  -F/u with EP as outpatient for ablation   -Counseled about cpap compliance    32M w/ no known PMH presenting s/p pre-syncopal episode. Patient was found to be atrial flutter. patient admitted for further care and EPS consulted.      Impression   -Atrial flutter CHDASVASC of 1 s/p GURINDER and CV  -New onset Cardiomyopathy / HFrEF per GURINDER, EF 20%  -JANELLE non compliant with CPAP      Recommendations   -Telemetry monitor   -2d ECHO after CV to reassess EF  -CCTA to assess coronary arteries   -Mag above 2 and potassium above 4  -TSH  -switch Cardizem to metoprolol BID vs TID to titrate HR around 60 for the CCTA  - switch to Eliquis 5mg BID on discharge  -Cardiology follow up for GDMT  -F/u with EP as outpatient for ablation   -Counseled about cpap compliance

## 2023-02-01 NOTE — CONSULT NOTE ADULT - ASSESSMENT
IMPRESSION  Dizziness from New Onset Atrial Flutter with RVR  JANELLE non compliant with CPAP  Obesity    RECOMMENDATIONS  telemonitoring  check TSH, TTE, urine tox  c/w cardizem 30mg q6 PO  c/w Lovenox therapeutic dose for now, switch to Eliquis 5mg BID on discharge for 6 weeks   keep NPO for GURINDER/CV today 2/1/23  outpatient EP eval for possible ablation  counselled on weight loss and CPAP compliance  IMPRESSION  Dizziness from New Onset Atrial Flutter with RVR  JANELLE non compliant with CPAP  Obesity    RECOMMENDATIONS  telemonitoring  check TSH, TTE, urine tox  c/w cardizem 30mg q6 PO  c/w Lovenox therapeutic dose for now, switch to Eliquis 5mg BID on discharge for 6 weeks   Please get stat COVID-19 PCR  keep NPO for GURINDER/CV today 2/1/23  outpatient EP eval for possible ablation  counselled on weight loss and CPAP compliance  IMPRESSION  Dizziness from New Onset Atrial Flutter with RVR  JANELLE non compliant with CPAP  Obesity    RECOMMENDATIONS  telemonitoring  check TSH, TTE, urine tox  c/w cardizem 30mg q6 PO  c/w Lovenox therapeutic dose for now, switch to Eliquis 5mg BID on discharge for 6 weeks   Please get stat COVID-19 PCR  keep NPO for GURINDER/CV today 2/1/23  EP eval for possible ablation  counselled on weight loss and CPAP compliance

## 2023-02-01 NOTE — CONSULT NOTE ADULT - SUBJECTIVE AND OBJECTIVE BOX
HPI:  32M w/ no known PMH presenting s/p pre-syncopal episode. Patient was in his usual state of health until earlier today when he suddenly developed lightheadedness, diaphoresis, sub-sternal chest discomfort, and generalized weakness. No reported LOC or HT. Episode lasted for a few minutes and then spontaneously resolved, but recurrent episodes intermittently occurring throughout day. No known aggravating or alleviating factors. Has never experienced anything similar before. No reported fevers, abdominal pain, n/v/d, dysuria, or LE swelling. No recent illnesses, sick contacts, changes in medications, or illicit/recreational drug use.  In ED, pt tachycardic (133) but otherwise HD stable on vitals. Labs showed D-Dimer 293 and initial troponin negative.  CXR shows no acute cardiopulmonary disease. CTA Chest shows no acute PE. S/P 2L LR bolus, diltiazem 15mg IV x1, and Lovenox 110mg SQ x1. On telemetry, HR slowed down to 90's after diltiazem and underlying aflutter became more obvious. Subsequently admitted to telemetry for new onset aflutter. (2023 20:58)  EP consulted for further care.       PAST MEDICAL & SURGICAL HISTORY  No pertinent past medical history    S/P appendectomy        FAMILY HISTORY:  FAMILY HISTORY:  FH: ALS (amyotrophic lateral sclerosis) (Father, Sibling)        SOCIAL HISTORY:  []smoker  []Alcohol  []Drug    ALLERGIES:  No Known Allergies      MEDICATIONS:  MEDICATIONS  (STANDING):  diltiazem    Tablet 30 milliGRAM(s) Oral every 6 hours  enoxaparin Injectable 110 milliGRAM(s) SubCutaneous once  enoxaparin Injectable 130 milliGRAM(s) SubCutaneous every 12 hours    MEDICATIONS  (PRN):  acetaminophen     Tablet .. 650 milliGRAM(s) Oral every 6 hours PRN Temp greater or equal to 38C (100.4F), Mild Pain (1 - 3)      HOME MEDICATIONS:  Home Medications:      VITALS:   T(F): 98.1 ( @ 08:05), Max: 98.1 ( @ 08:05)  HR: 103 ( @ 08:05) (103 - 138)  BP: 139/93 ( @ 08:05) (113/82 - 139/93)  BP(mean): --  RR: 20 ( @ 08:05) (20 - 20)  SpO2: 100% ( @ 08:05) (99% - 100%)    I&O's Summary      REVIEW OF SYSTEMS:  CONSTITUTIONAL: No weakness, fevers or chills  EYES: No visual changes  ENT: No vertigo or throat pain   NECK: No pain or stiffness  RESPIRATORY: No cough, wheezing, hemoptysis; No shortness of breath  CARDIOVASCULAR: as per HPI  GASTROINTESTINAL: No abdominal or epigastric pain. No nausea, vomiting, or hematemesis; No diarrhea or constipation. No melena or hematochezia.  GENITOURINARY: No dysuria, frequency or hematuria  NEUROLOGICAL: No numbness or weakness  SKIN: No itching, no rashes  MSK: No pain    PHYSICAL EXAM:  NEURO: patient is awake , alert and oriented  GEN: Not in acute distress  NECK: no thyroid enlargement, no JVD  LUNGS: Clear to auscultation bilaterally   CARDIOVASCULAR: S1/S2 present, RRR , no murmurs or rubs, no carotid bruits,  + PP bilaterally  ABD: Soft, non-tender, non-distended, +BS  EXT: No RODRIGUE  SKIN: Intact    LABS:                        15.4   7.92  )-----------( 256      ( 2023 07:48 )             47.3         142  |  105  |  9<L>  ----------------------------<  102<H>  4.4   |  24  |  1.0    Ca    9.7      2023 07:48  Phos  3.6       Mg     2.1         TPro  7.0  /  Alb  4.5  /  TBili  1.3<H>  /  DBili  x   /  AST  17  /  ALT  22  /  AlkPhos  123<H>        Troponin T, Serum: <0.01 ng/mL (23 @ 07:48)  Troponin T, Serum: <0.01 ng/mL (23 @ 21:52)  Troponin T, Serum: <0.01 ng/mL (23 @ 19:04)  Troponin T, Serum: <0.01 ng/mL (23 @ 14:45)    CARDIAC MARKERS ( 2023 07:48 )  x     / <0.01 ng/mL / x     / x     / x      CARDIAC MARKERS ( 2023 21:52 )  x     / <0.01 ng/mL / x     / x     / x      CARDIAC MARKERS ( 2023 19:04 )  x     / <0.01 ng/mL / x     / x     / x      CARDIAC MARKERS ( 2023 14:45 )  x     / <0.01 ng/mL / x     / x     / x            Troponin trend:    Serum Pro-Brain Natriuretic Peptide: 2104 pg/mL (23 @ 07:48)     Chol 188 LDL -- HDL 42 Trig 98      RADIOLOGY:  -CXR:    < from: CT Angio Chest PE Protocol w/ IV Cont (23 @ 16:43) >  IMPRESSION:    1. No evidence of acute pulmonary embolus or other acute intrathoracic   pathology.    --- End of Report ---    < end of copied text >      EC Lead ECG:   Ventricular Rate 135 BPM    Atrial Rate 135 BPM    P-R Interval 200 ms    QRS Duration 94 ms    Q-T Interval 186 ms    QTC Calculation(Bazett) 279 ms    P Axis 41 degrees    R Axis 68 degrees    T Axis 89 degrees    Diagnosis Line Sinus tachycardia  Possible Left atrial enlargement  Inferior infarct , age undetermined  Abnormal ECG    Confirmed by Wil Mckee ([id number]) on 2023 4:16:28 PM (23 @ 12:11)         HPI:  32M w/ no known PMH presenting s/p pre-syncopal episode. Patient was in his usual state of health until earlier today when he suddenly developed lightheadedness, diaphoresis, sub-sternal chest discomfort, and generalized weakness. No reported LOC or HT. Episode lasted for a few minutes and then spontaneously resolved, but recurrent episodes intermittently occurring throughout day. No known aggravating or alleviating factors. Has never experienced anything similar before. No reported fevers, abdominal pain, n/v/d, dysuria, or LE swelling. No recent illnesses, sick contacts, changes in medications, or illicit/recreational drug use.  In ED, pt tachycardic (133) but otherwise HD stable on vitals. Labs showed D-Dimer 293 and initial troponin negative.  CXR shows no acute cardiopulmonary disease. CTA Chest shows no acute PE. S/P 2L LR bolus, diltiazem 15mg IV x1, and Lovenox 110mg SQ x1. On telemetry, HR slowed down to 90's after diltiazem and underlying aflutter became more obvious. Subsequently admitted to telemetry for new onset aflutter. (2023 20:58)    EP addendum   EP consulted for atrial flutter. patient with PMHx of JANELLE not compliant with CPAP. states he started to have chest pain, SOB and palpitations. no hx of syncope or SCD.   currently rate controlled on Cardizem PO      PAST MEDICAL & SURGICAL HISTORY  No pertinent past medical history    S/P appendectomy        FAMILY HISTORY:  FAMILY HISTORY:  FH: ALS (amyotrophic lateral sclerosis) (Father, Sibling)        SOCIAL HISTORY:  []smoker  []Alcohol  []Drug    ALLERGIES:  No Known Allergies      MEDICATIONS:  MEDICATIONS  (STANDING):  diltiazem    Tablet 30 milliGRAM(s) Oral every 6 hours  enoxaparin Injectable 110 milliGRAM(s) SubCutaneous once  enoxaparin Injectable 130 milliGRAM(s) SubCutaneous every 12 hours    MEDICATIONS  (PRN):  acetaminophen     Tablet .. 650 milliGRAM(s) Oral every 6 hours PRN Temp greater or equal to 38C (100.4F), Mild Pain (1 - 3)      HOME MEDICATIONS:  Home Medications:      VITALS:   T(F): 98.1 ( @ 08:05), Max: 98.1 ( @ 08:05)  HR: 103 ( @ 08:05) (103 - 138)  BP: 139/93 ( @ 08:05) (113/82 - 139/93)  BP(mean): --  RR: 20 ( @ 08:05) (20 - 20)  SpO2: 100% ( @ 08:05) (99% - 100%)    I&O's Summary      REVIEW OF SYSTEMS:  CONSTITUTIONAL: No weakness, fevers or chills  EYES: No visual changes  ENT: No vertigo or throat pain   NECK: No pain or stiffness  RESPIRATORY: No cough, wheezing, hemoptysis; No shortness of breath  CARDIOVASCULAR: as per HPI  GASTROINTESTINAL: No abdominal or epigastric pain. No nausea, vomiting, or hematemesis; No diarrhea or constipation. No melena or hematochezia.  GENITOURINARY: No dysuria, frequency or hematuria  NEUROLOGICAL: No numbness or weakness  SKIN: No itching, no rashes  MSK: No pain    PHYSICAL EXAM:  NEURO: patient is awake , alert and oriented  GEN: Not in acute distress  NECK: no thyroid enlargement, no JVD  LUNGS: Clear to auscultation bilaterally   CARDIOVASCULAR: S1/S2 present, RRR , no murmurs or rubs, no carotid bruits,  + PP bilaterally  ABD: Soft, non-tender, non-distended, +BS  EXT: No RODRIGUE  SKIN: Intact    LABS:                        15.4   7.92  )-----------( 256      ( 2023 07:48 )             47.3         142  |  105  |  9<L>  ----------------------------<  102<H>  4.4   |  24  |  1.0    Ca    9.7      2023 07:48  Phos  3.6       Mg     2.1         TPro  7.0  /  Alb  4.5  /  TBili  1.3<H>  /  DBili  x   /  AST  17  /  ALT  22  /  AlkPhos  123<H>        Troponin T, Serum: <0.01 ng/mL (23 @ 07:48)  Troponin T, Serum: <0.01 ng/mL (23 @ 21:52)  Troponin T, Serum: <0.01 ng/mL (23 @ 19:04)  Troponin T, Serum: <0.01 ng/mL (23 @ 14:45)    CARDIAC MARKERS ( 2023 07:48 )  x     / <0.01 ng/mL / x     / x     / x      CARDIAC MARKERS ( 2023 21:52 )  x     / <0.01 ng/mL / x     / x     / x      CARDIAC MARKERS ( 2023 19:04 )  x     / <0.01 ng/mL / x     / x     / x      CARDIAC MARKERS ( 2023 14:45 )  x     / <0.01 ng/mL / x     / x     / x            Troponin trend:    Serum Pro-Brain Natriuretic Peptide: 2104 pg/mL (23 @ 07:48)    02 Chol 188 LDL -- HDL 42 Trig 98      RADIOLOGY:  -CXR:    < from: CT Angio Chest PE Protocol w/ IV Cont (23 @ 16:43) >  IMPRESSION:    1. No evidence of acute pulmonary embolus or other acute intrathoracic   pathology.    --- End of Report ---    < end of copied text >      EC Lead ECG:   Ventricular Rate 135 BPM    Atrial Rate 135 BPM    P-R Interval 200 ms    QRS Duration 94 ms    Q-T Interval 186 ms    QTC Calculation(Bazett) 279 ms    P Axis 41 degrees    R Axis 68 degrees    T Axis 89 degrees    Diagnosis Line Sinus tachycardia  Possible Left atrial enlargement  Inferior infarct , age undetermined  Abnormal ECG    Confirmed by Wil Mckee ([id number]) on 2023 4:16:28 PM (23 @ 12:11)

## 2023-02-01 NOTE — CONSULT NOTE ADULT - SUBJECTIVE AND OBJECTIVE BOX
HPI:  32M w/ no known PMH presenting s/p pre-syncopal episode. Patient was in his usual state of health until earlier today when he suddenly developed lightheadedness, diaphoresis, sub-sternal chest discomfort, and generalized weakness. No reported LOC or HT. Episode lasted for a few minutes and then spontaneously resolved, but recurrent episodes intermittently occurring throughout day. No known aggravating or alleviating factors. Has never experienced anything similar before. No reported fevers, abdominal pain, n/v/d, dysuria, or LE swelling. No recent illnesses, sick contacts, changes in medications, or illicit/recreational drug use.    In ED, pt tachycardic (133) but otherwise HD stable on vitals. Labs showed D-Dimer 293 and initial troponin negative. EKG shows no acute ischemic changes. EKG officially read as NSR, but appears to be aflutter. CXR shows no acute cardiopulmonary disease. CTA Chest shows no acute PE. S/P 2L LR bolus, diltiazem 15mg IV x1, and Lovenox 110mg SQ x1. On telemetry, HR slowed down to 90's after diltiazem and underlying aflutter became more obvious. Subsequently admitted to telemetry for new onset aflutter. (2023 20:58)        PAST MEDICAL & SURGICAL HISTORY  No pertinent past medical history    S/P appendectomy        FAMILY HISTORY:  FAMILY HISTORY:  FH: ALS (amyotrophic lateral sclerosis) (Father, Sibling)        SOCIAL HISTORY:  Social History:      ALLERGIES:  No Known Allergies      MEDICATIONS:  diltiazem    Tablet 30 milliGRAM(s) Oral every 6 hours  enoxaparin Injectable 110 milliGRAM(s) SubCutaneous once  enoxaparin Injectable 130 milliGRAM(s) SubCutaneous every 12 hours    PRN:  acetaminophen     Tablet .. 650 milliGRAM(s) Oral every 6 hours PRN      HOME MEDICATIONS:  Home Medications:      VITALS:   T(F): 97.6 ( @ 15:53), Max: 97.7 ( @ 12:09)  HR: 133 ( @ 01:32) (129 - 138)  BP: 113/82 ( @ 19:44) (113/82 - 138/92)  BP(mean): --  RR: 20 ( @ 18:23) (20 - 20)  SpO2: 100% ( @ 18:23) (99% - 100%)    I&O's Summary      REVIEW OF SYSTEMS:  CONSTITUTIONAL: No weakness, fevers or chills  HEENT: No visual changes, neck/ear pain  RESPIRATORY: No cough, sob  CARDIOVASCULAR: See HPI  GASTROINTESTINAL: No abdominal pain. No nausea, vomiting, diarrhea   GENITOURINARY: No dysuria, frequency or hematuria  NEUROLOGICAL: No new focal deficits  SKIN: No new rashes    PHYSICAL EXAM:  General: Not in distress.  Non-toxic appearing.   HEENT: EOMI  Cardio: Irregular, S1, S2   Pulm: B/L BS.  No wheezing / crackles / rales  Abdomen: Soft, non-tender, non-distended. Normoactive bowel sounds  Extremities: No edema b/l le  Neuro: A&O x3. No focal deficits    LABS:                        14.6   9.61  )-----------( 251      ( 2023 14:45 )             44.4         137  |  102  |  11  ----------------------------<  110<H>  4.1   |  27  |  1.0    Ca    9.0      2023 19:04  Mg     2.1         TPro  7.4  /  Alb  4.4  /  TBili  1.2  /  DBili  x   /  AST  42<H>  /  ALT  28  /  AlkPhos  123<H>        Troponin T, Serum: <0.01 ng/mL (23 @ 21:52)  Troponin T, Serum: <0.01 ng/mL (23 @ 19:04)  Troponin T, Serum: <0.01 ng/mL (23 @ 14:45)    CARDIAC MARKERS ( 2023 21:52 )  x     / <0.01 ng/mL / x     / x     / x      CARDIAC MARKERS ( 2023 19:04 )  x     / <0.01 ng/mL / x     / x     / x      CARDIAC MARKERS ( 2023 14:45 )  x     / <0.01 ng/mL / x     / x     / x            Troponin trend:            RADIOLOGY:  -CXR: < from: Xray Chest 1 View- PORTABLE-Urgent (Xray Chest 1 View- PORTABLE-Urgent .) (23 @ 14:29) >  Impression:    No radiographic evidence of acute cardiopulmonary disease.      < end of copied text >    -TTE: N/A  -CCTA: N/A  -STRESS TEST: N/A  -CATHETERIZATION: N/A  -OTHER:  EC Lead ECG:   Ventricular Rate 135 BPM    Atrial Rate 135 BPM    P-R Interval 200 ms    QRS Duration 94 ms    Q-T Interval 186 ms    QTC Calculation(Bazett) 279 ms    P Axis 41 degrees    R Axis 68 degrees    T Axis 89 degrees    Diagnosis Line Sinus tachycardia  Possible Left atrial enlargement  Inferior infarct , age undetermined  Abnormal ECG    Confirmed by Wil Mckee ([id number]) on 2023 4:16:28 PM ( @ 12:11)      TELEMETRY EVENTS: AFL rate controlled

## 2023-02-01 NOTE — CHART NOTE - NSCHARTNOTEFT_GEN_A_CORE
POST OPERATIVE PROCEDURAL DOCUMENTATION  PRE-OP DIAGNOSIS:  AF      POST-OP DIAGNOSIS:  AF s/p successful CV    PROCEDURE: Transesophageal echocardiogram    Primary Physician:  Dr. Mckee  Assistant: Dr. Tavarez    ANESTHESIA TYPE  [  ] General Anesthesia  [ x ] Conscious Sedation  [  ] Local/Regional    CONDITION  [  ] Critical  [  ] Serious  [  ] Fair  [ x ] Good    SPECIMENS REMOVED (IF APPLICABLE): N/A    IMPLANTS (IF APPLICABLE): None    ESTIMATED BLOOD LOSS: None    COMPLICATIONS: None      FINDINGS:    After risks and benefits of procedures were explained, informed consent was obtained and placed in chart. Refer to Anesthesia note for sedation details.  The GURINDER probe was passed into the esophagus without difficulty.  Transesophageal and transgastric images were obtained.  The GURINDER probe was removed without difficulty and examined.  There was no evidence for bleeding.  The patient tolerated the procedure well without any immediate GURINDER-related complications.      Preliminary Findings:  LA:   enlarged  MATTHEW: Left atrial appendage was clear of clot.   LV: LVEF severely reduced 20%   MV: mild MR, no evidence of MS.   AV: No evidence of AI, no evidence of AS.   TV: mild TR.   PV: trace PI.   IAS: no PFO. No R-> L shunt with agitated saline.       Patient successfully converted to sinus rhythm with synchronized  __200_ J of direct current cardioversion.      DIAGNOSIS/IMPRESSION:  AF s/p successful CV      PLAN OF CARE:  return to floor  f/u with cardiologist POST OPERATIVE PROCEDURAL DOCUMENTATION  PRE-OP DIAGNOSIS:  AF      POST-OP DIAGNOSIS:  AF s/p successful CV    PROCEDURE: Transesophageal echocardiogram    Primary Physician:  Dr. Mckee  Assistant: Dr. Tavarez    ANESTHESIA TYPE  [  ] General Anesthesia  [ x ] Conscious Sedation  [  ] Local/Regional    CONDITION  [  ] Critical  [  ] Serious  [  ] Fair  [ x ] Good    SPECIMENS REMOVED (IF APPLICABLE): N/A    IMPLANTS (IF APPLICABLE): None    ESTIMATED BLOOD LOSS: None    COMPLICATIONS: None      FINDINGS:    After risks and benefits of procedures were explained, informed consent was obtained and placed in chart. Refer to Anesthesia note for sedation details.  The GURINDER probe was passed into the esophagus without difficulty.  Transesophageal and transgastric images were obtained.  The GURINDER probe was removed without difficulty and examined.  There was no evidence for bleeding.  The patient tolerated the procedure well without any immediate GURINDER-related complications.      Preliminary Findings:  < from: Transesophageal Echocardiogram (02.01.23 @ 12:54) >    1. Left ventricular ejection fraction, by visual estimation, is <20%.   2. Severely decreased global left ventricular systolic function.   3. Moderately reduced RV systolic function.   4. Mild mitral valve regurgitation.   5.Mild tricuspid regurgitation.   6. Left atrial enlargement.   7. Mildly enlarged right atrium.   8. Saline contrast bubble study was negative, with no evidence of any   intracardiac shunt.   9. Post GURINDER, patient underwent successful cardioversion to normal sinus   rhythm (DCCV at 200J x 1).    < end of copied text >          Patient successfully converted to sinus rhythm with synchronized  __200_ J of direct current cardioversion.      DIAGNOSIS/IMPRESSION:  AF s/p successful CV      PLAN OF CARE:  return to floor  f/u with cardiologist

## 2023-02-01 NOTE — CONSULT NOTE ADULT - ATTENDING COMMENTS
No cardiac history.  Comorbidities as above.    New AFL diagnosis s/p DCCV.    ECHO reviewed: Dilated LV with biatrial enlargement.  Severe LV dysfunction.  Moderate RV dysfunction.    Hemodynamics stable.  Relatively compensated / euvolemic.    - Plan for CCTA to rule-oul CAD given overload low risk profile for ischemic cardiomyopathy.  - Cont Amiodarone / beta-bloclker.  = Cotrn Eliquis. No cardiac history.  Comorbidities as above.  Notably JANELLE (CPAP noncompliant).    New AFL diagnosis s/p DCCV.    ECHO reviewed: Dilated LV with biatrial enlargement.  Severe LV dysfunction.  Moderate RV dysfunction.    Hemodynamics stable.  Relatively compensated / euvolemic.    - Plan for CCTA to rule-oul CAD given overload low risk profile for ischemic cardiomyopathy.  - Cont Metoprolol  - Cont Eliquis  - Start Entresto if hemodynamics remain stable

## 2023-02-02 LAB
ALBUMIN SERPL ELPH-MCNC: 4.3 G/DL — SIGNIFICANT CHANGE UP (ref 3.5–5.2)
ALP SERPL-CCNC: 116 U/L — HIGH (ref 30–115)
ALT FLD-CCNC: 22 U/L — SIGNIFICANT CHANGE UP (ref 0–41)
ANION GAP SERPL CALC-SCNC: 8 MMOL/L — SIGNIFICANT CHANGE UP (ref 7–14)
AST SERPL-CCNC: 18 U/L — SIGNIFICANT CHANGE UP (ref 0–41)
BILIRUB SERPL-MCNC: 1 MG/DL — SIGNIFICANT CHANGE UP (ref 0.2–1.2)
BUN SERPL-MCNC: 10 MG/DL — SIGNIFICANT CHANGE UP (ref 10–20)
CALCIUM SERPL-MCNC: 9.4 MG/DL — SIGNIFICANT CHANGE UP (ref 8.4–10.4)
CHLORIDE SERPL-SCNC: 100 MMOL/L — SIGNIFICANT CHANGE UP (ref 98–110)
CO2 SERPL-SCNC: 27 MMOL/L — SIGNIFICANT CHANGE UP (ref 17–32)
CREAT SERPL-MCNC: 1 MG/DL — SIGNIFICANT CHANGE UP (ref 0.7–1.5)
EGFR: 102 ML/MIN/1.73M2 — SIGNIFICANT CHANGE UP
GLUCOSE SERPL-MCNC: 103 MG/DL — HIGH (ref 70–99)
HCT VFR BLD CALC: 41.4 % — LOW (ref 42–52)
HGB BLD-MCNC: 13.5 G/DL — LOW (ref 14–18)
MAGNESIUM SERPL-MCNC: 1.9 MG/DL — SIGNIFICANT CHANGE UP (ref 1.8–2.4)
MCHC RBC-ENTMCNC: 27.4 PG — SIGNIFICANT CHANGE UP (ref 27–31)
MCHC RBC-ENTMCNC: 32.6 G/DL — SIGNIFICANT CHANGE UP (ref 32–37)
MCV RBC AUTO: 84 FL — SIGNIFICANT CHANGE UP (ref 80–94)
NRBC # BLD: 0 /100 WBCS — SIGNIFICANT CHANGE UP (ref 0–0)
PLATELET # BLD AUTO: 246 K/UL — SIGNIFICANT CHANGE UP (ref 130–400)
POTASSIUM SERPL-MCNC: 4.8 MMOL/L — SIGNIFICANT CHANGE UP (ref 3.5–5)
POTASSIUM SERPL-SCNC: 4.8 MMOL/L — SIGNIFICANT CHANGE UP (ref 3.5–5)
PROT SERPL-MCNC: 7.1 G/DL — SIGNIFICANT CHANGE UP (ref 6–8)
RBC # BLD: 4.93 M/UL — SIGNIFICANT CHANGE UP (ref 4.7–6.1)
RBC # FLD: 13.6 % — SIGNIFICANT CHANGE UP (ref 11.5–14.5)
SODIUM SERPL-SCNC: 135 MMOL/L — SIGNIFICANT CHANGE UP (ref 135–146)
WBC # BLD: 11.03 K/UL — HIGH (ref 4.8–10.8)
WBC # FLD AUTO: 11.03 K/UL — HIGH (ref 4.8–10.8)

## 2023-02-02 PROCEDURE — 99233 SBSQ HOSP IP/OBS HIGH 50: CPT

## 2023-02-02 RX ORDER — METOPROLOL TARTRATE 50 MG
50 TABLET ORAL ONCE
Refills: 0 | Status: COMPLETED | OUTPATIENT
Start: 2023-02-02 | End: 2023-02-02

## 2023-02-02 RX ORDER — ENOXAPARIN SODIUM 100 MG/ML
130 INJECTION SUBCUTANEOUS EVERY 12 HOURS
Refills: 0 | Status: DISCONTINUED | OUTPATIENT
Start: 2023-02-02 | End: 2023-02-03

## 2023-02-02 RX ORDER — APIXABAN 2.5 MG/1
1 TABLET, FILM COATED ORAL
Qty: 120 | Refills: 0
Start: 2023-02-02 | End: 2023-04-02

## 2023-02-02 RX ADMIN — ENOXAPARIN SODIUM 130 MILLIGRAM(S): 100 INJECTION SUBCUTANEOUS at 18:25

## 2023-02-02 RX ADMIN — Medication 50 MILLIGRAM(S): at 10:22

## 2023-02-02 RX ADMIN — APIXABAN 5 MILLIGRAM(S): 2.5 TABLET, FILM COATED ORAL at 06:11

## 2023-02-02 RX ADMIN — Medication 25 MILLIGRAM(S): at 18:25

## 2023-02-02 RX ADMIN — Medication 25 MILLIGRAM(S): at 06:11

## 2023-02-02 RX ADMIN — Medication 50 MILLIGRAM(S): at 12:10

## 2023-02-02 NOTE — CHART NOTE - NSCHARTNOTEFT_GEN_A_CORE
The patient needs is going for CCTA tomorrow. Please optimize the HR accordingly. Depending, on HR and BP the dose of lopressor needs to be adjusted.

## 2023-02-02 NOTE — PROGRESS NOTE ADULT - SUBJECTIVE AND OBJECTIVE BOX
Patient is a 32y old  Male who presents with a chief complaint of pre-syncope (02-01-23)      Pt seen and examined at bedside. No CP or SOB.          PAST MEDICAL & SURGICAL HISTORY:  No pertinent past medical history    S/P appendectomy        VITAL SIGNS (Last 24 hrs):  T(C): 36.3 (02-02-23 @ 08:20), Max: 36.4 (02-02-23 @ 00:38)  HR: 77 (02-02-23 @ 11:38) (77 - 98)  BP: 119/80 (02-02-23 @ 11:38) (113/93 - 125/89)  RR: 16 (02-02-23 @ 08:20) (16 - 19)  SpO2: 97% (02-02-23 @ 08:20) (94% - 97%)  Wt(kg): --  Daily     Daily     I&O's Summary      PHYSICAL EXAM:  GENERAL: NAD, well-developed  HEAD:  Atraumatic, Normocephalic  EYES: EOMI, PERRLA, conjunctiva and sclera clear  NECK: Supple, No JVD  CHEST/LUNG: Clear to auscultation bilaterally; No wheeze  HEART: Regular rate and rhythm; No murmurs, rubs, or gallops  ABDOMEN: Soft, Nontender, Nondistended; Bowel sounds present  EXTREMITIES:  2+ Peripheral Pulses, No clubbing, cyanosis, or edema  PSYCH: AAOx3  NEUROLOGY: non-focal  SKIN: No rashes or lesions    Labs Reviewed  Spoke to patient in regards to abnormal labs.    CBC Full  -  ( 02 Feb 2023 06:10 )  WBC Count : 11.03 K/uL  Hemoglobin : 13.5 g/dL  Hematocrit : 41.4 %  Platelet Count - Automated : 246 K/uL  Mean Cell Volume : 84.0 fL  Mean Cell Hemoglobin : 27.4 pg  Mean Cell Hemoglobin Concentration : 32.6 g/dL  Auto Neutrophil # : x  Auto Lymphocyte # : x  Auto Monocyte # : x  Auto Eosinophil # : x  Auto Basophil # : x  Auto Neutrophil % : x  Auto Lymphocyte % : x  Auto Monocyte % : x  Auto Eosinophil % : x  Auto Basophil % : x    BMP:    02-02 @ 06:10    Blood Urea Nitrogen - 10  Calcium - 9.4  Carbond Dioxide - 27  Chloride - 100  Creatinine - 1.0  Glucose - 103  Potassium - 4.8  Sodium - 135      Hemoglobin A1c -     Urine Culture:        COVID Labs  CRP:      D-Dimer:  293 ng/mL DDU (01-31-23 @ 15:10)            Imaging reviewed independently and reviewed read        MEDICATIONS  (STANDING):  apixaban 5 milliGRAM(s) Oral every 12 hours  metoprolol tartrate 25 milliGRAM(s) Oral two times a day    MEDICATIONS  (PRN):  acetaminophen     Tablet .. 650 milliGRAM(s) Oral every 6 hours PRN Temp greater or equal to 38C (100.4F), Mild Pain (1 - 3)       Patient is a 32y old  Male who presents with a chief complaint of pre-syncope (02-01-23)      Pt seen and examined at bedside. No CP or SOB. SInus on cardiac telemonitoring           PAST MEDICAL & SURGICAL HISTORY:  No pertinent past medical history    S/P appendectomy        VITAL SIGNS (Last 24 hrs):  T(C): 36.3 (02-02-23 @ 08:20), Max: 36.4 (02-02-23 @ 00:38)  HR: 77 (02-02-23 @ 11:38) (77 - 98)  BP: 119/80 (02-02-23 @ 11:38) (113/93 - 125/89)  RR: 16 (02-02-23 @ 08:20) (16 - 19)  SpO2: 97% (02-02-23 @ 08:20) (94% - 97%)  Wt(kg): --  Daily     Daily     I&O's Summary      PHYSICAL EXAM:  GENERAL: NAD, well-developed  HEAD:  Atraumatic, Normocephalic  EYES: EOMI, PERRLA, conjunctiva and sclera clear  NECK: Supple, No JVD  CHEST/LUNG: Clear to auscultation bilaterally; No wheeze  HEART: Regular rate and rhythm; No murmurs, rubs, or gallops  ABDOMEN: Soft, Nontender, Nondistended; Bowel sounds present  EXTREMITIES:  2+ Peripheral Pulses, No clubbing, cyanosis, or edema  PSYCH: AAOx3  NEUROLOGY: non-focal  SKIN: No rashes or lesions    Labs Reviewed  Spoke to patient in regards to abnormal labs.    CBC Full  -  ( 02 Feb 2023 06:10 )  WBC Count : 11.03 K/uL  Hemoglobin : 13.5 g/dL  Hematocrit : 41.4 %  Platelet Count - Automated : 246 K/uL  Mean Cell Volume : 84.0 fL  Mean Cell Hemoglobin : 27.4 pg  Mean Cell Hemoglobin Concentration : 32.6 g/dL  Auto Neutrophil # : x  Auto Lymphocyte # : x  Auto Monocyte # : x  Auto Eosinophil # : x  Auto Basophil # : x  Auto Neutrophil % : x  Auto Lymphocyte % : x  Auto Monocyte % : x  Auto Eosinophil % : x  Auto Basophil % : x    BMP:    02-02 @ 06:10    Blood Urea Nitrogen - 10  Calcium - 9.4  Carbond Dioxide - 27  Chloride - 100  Creatinine - 1.0  Glucose - 103  Potassium - 4.8  Sodium - 135      Hemoglobin A1c -     Urine Culture:        COVID Labs  CRP:      D-Dimer:  293 ng/mL DDU (01-31-23 @ 15:10)            Imaging reviewed independently and reviewed read    < from: Transesophageal Echocardiogram (02.01.23 @ 12:54) >  Summary:   1. Left ventricular ejection fraction, by visual estimation, is <20%.   2. Severely decreased global left ventricular systolic function.   3. Moderately reduced RV systolic function.   4. Mild mitral valve regurgitation.   5.Mild tricuspid regurgitation.   6. Left atrial enlargement.   7. Mildly enlarged right atrium.   8. Saline contrast bubble study was negative, with no evidence of any   intracardiac shunt.   9. Post GURINDER, patient underwent successful cardioversion to normal sinus   rhythm (DCCV at 200J x 1).    < end of copied text >      MEDICATIONS  (STANDING):  enoxaparin Injectable 130 milliGRAM(s) SubCutaneous every 12 hours  metoprolol tartrate 25 milliGRAM(s) Oral two times a day    MEDICATIONS  (PRN):  acetaminophen     Tablet .. 650 milliGRAM(s) Oral every 6 hours PRN Temp greater or equal to 38C (100.4F), Mild Pain (1 - 3)

## 2023-02-02 NOTE — PATIENT PROFILE ADULT - FALL HARM RISK - HARM RISK INTERVENTIONS

## 2023-02-03 ENCOUNTER — TRANSCRIPTION ENCOUNTER (OUTPATIENT)
Age: 33
End: 2023-02-03

## 2023-02-03 LAB
ANION GAP SERPL CALC-SCNC: 13 MMOL/L — SIGNIFICANT CHANGE UP (ref 7–14)
BUN SERPL-MCNC: 14 MG/DL — SIGNIFICANT CHANGE UP (ref 10–20)
CALCIUM SERPL-MCNC: 9.1 MG/DL — SIGNIFICANT CHANGE UP (ref 8.4–10.5)
CHLORIDE SERPL-SCNC: 102 MMOL/L — SIGNIFICANT CHANGE UP (ref 98–110)
CO2 SERPL-SCNC: 23 MMOL/L — SIGNIFICANT CHANGE UP (ref 17–32)
CREAT SERPL-MCNC: 1 MG/DL — SIGNIFICANT CHANGE UP (ref 0.7–1.5)
EGFR: 102 ML/MIN/1.73M2 — SIGNIFICANT CHANGE UP
GLUCOSE SERPL-MCNC: 97 MG/DL — SIGNIFICANT CHANGE UP (ref 70–99)
HCT VFR BLD CALC: 40.1 % — LOW (ref 42–52)
HGB BLD-MCNC: 13.1 G/DL — LOW (ref 14–18)
MAGNESIUM SERPL-MCNC: 2 MG/DL — SIGNIFICANT CHANGE UP (ref 1.8–2.4)
MCHC RBC-ENTMCNC: 28.2 PG — SIGNIFICANT CHANGE UP (ref 27–31)
MCHC RBC-ENTMCNC: 32.7 G/DL — SIGNIFICANT CHANGE UP (ref 32–37)
MCV RBC AUTO: 86.2 FL — SIGNIFICANT CHANGE UP (ref 80–94)
NRBC # BLD: 0 /100 WBCS — SIGNIFICANT CHANGE UP (ref 0–0)
PLATELET # BLD AUTO: 230 K/UL — SIGNIFICANT CHANGE UP (ref 130–400)
POTASSIUM SERPL-MCNC: 4.2 MMOL/L — SIGNIFICANT CHANGE UP (ref 3.5–5)
POTASSIUM SERPL-SCNC: 4.2 MMOL/L — SIGNIFICANT CHANGE UP (ref 3.5–5)
RBC # BLD: 4.65 M/UL — LOW (ref 4.7–6.1)
RBC # FLD: 13.6 % — SIGNIFICANT CHANGE UP (ref 11.5–14.5)
SODIUM SERPL-SCNC: 138 MMOL/L — SIGNIFICANT CHANGE UP (ref 135–146)
WBC # BLD: 9.45 K/UL — SIGNIFICANT CHANGE UP (ref 4.8–10.8)
WBC # FLD AUTO: 9.45 K/UL — SIGNIFICANT CHANGE UP (ref 4.8–10.8)

## 2023-02-03 PROCEDURE — 75571 CT HRT W/O DYE W/CA TEST: CPT | Mod: 26

## 2023-02-03 PROCEDURE — 93458 L HRT ARTERY/VENTRICLE ANGIO: CPT | Mod: 26

## 2023-02-03 PROCEDURE — 99239 HOSP IP/OBS DSCHRG MGMT >30: CPT

## 2023-02-03 RX ORDER — METOPROLOL TARTRATE 50 MG
10 TABLET ORAL ONCE
Refills: 0 | Status: DISCONTINUED | OUTPATIENT
Start: 2023-02-03 | End: 2023-02-03

## 2023-02-03 RX ORDER — METOPROLOL TARTRATE 50 MG
100 TABLET ORAL ONCE
Refills: 0 | Status: COMPLETED | OUTPATIENT
Start: 2023-02-03 | End: 2023-02-03

## 2023-02-03 RX ORDER — ENOXAPARIN SODIUM 100 MG/ML
130 INJECTION SUBCUTANEOUS EVERY 12 HOURS
Refills: 0 | Status: DISCONTINUED | OUTPATIENT
Start: 2023-02-04 | End: 2023-02-04

## 2023-02-03 RX ORDER — METOPROLOL TARTRATE 50 MG
1 TABLET ORAL
Qty: 60 | Refills: 0
Start: 2023-02-03 | End: 2023-04-03

## 2023-02-03 RX ORDER — METOPROLOL TARTRATE 50 MG
10 TABLET ORAL ONCE
Refills: 0 | Status: COMPLETED | OUTPATIENT
Start: 2023-02-03 | End: 2023-02-03

## 2023-02-03 RX ADMIN — Medication 100 MILLIGRAM(S): at 10:45

## 2023-02-03 RX ADMIN — Medication 650 MILLIGRAM(S): at 21:45

## 2023-02-03 RX ADMIN — Medication 25 MILLIGRAM(S): at 06:34

## 2023-02-03 RX ADMIN — Medication 650 MILLIGRAM(S): at 22:45

## 2023-02-03 RX ADMIN — ENOXAPARIN SODIUM 130 MILLIGRAM(S): 100 INJECTION SUBCUTANEOUS at 06:34

## 2023-02-03 NOTE — DISCHARGE NOTE PROVIDER - HOSPITAL COURSE
32M w/ no known PMH presenting s/p pre-syncopal episode. Patient was in his usual state of health until earlier today when he suddenly developed lightheadedness, diaphoresis, sub-sternal chest discomfort, and generalized weakness. No reported LOC or HT. Episode lasted for a few minutes and then spontaneously resolved, but recurrent episodes intermittently occurring throughout day. No known aggravating or alleviating factors. Has never experienced anything similar before. No reported fevers, abdominal pain, n/v/d, dysuria, or LE swelling. No recent illnesses, sick contacts, changes in medications, or illicit/recreational drug use.    In ED, pt tachycardic (133) but otherwise HD stable on vitals. Labs showed D-Dimer 293 and initial troponin negative. EKG shows no acute ischemic changes. EKG officially read as NSR, but appears to be aflutter. CXR shows no acute cardiopulmonary disease. CTA Chest shows no acute PE. S/P 2L LR bolus, diltiazem 15mg IV x1, and Lovenox 110mg SQ x1. On telemetry, HR slowed down to 90's after diltiazem and underlying aflutter became more obvious. Subsequently admitted to telemetry for new onset aflutter.    Throughout hospitalization, pt attempted to obtain CCTA, however was unable to maintain adequate HR for a diagnostic study.    #Paroxysmal Atrial Flutter w/ RVR, new onset s/p cardioversion   #ACS, r/o  #Acute HFrEF 20% possible 2/2 aflutter   Initially p/w pre-syncope. Tachycardic on admission ('s). Orthostatic vitals negative. D-Dimer 293 and Tn neg x2. No acute ischemic changes on EKG, but did show aflutter and possible q waves in inferior leads. CHADS-VASC >0.   ACS w/u given pt's young age and possible presence of q waves on EKG. However, likely secondary to underlying untreated JANELLE/OHS. Pt is non compliant to CPAP  - c/w diltiazem 30mg PO QID (titrate up by HR)  - c/w Lovenox 130mg SQ BID (switch to DOAC on discharge), recieved one dose of apixiban today   - admit to telemetry  - check lipid panel, A1c, and TSH  - CCTA today, dw cardiology Dr. Maniatas, if unable to do CCTA, then cath today, pt is tentatively on the schedule, no need for repeat echo  - EP appreicted, dw cardiology, follow up recc, although CHADSvasc 1 now EP recc eliquis on dc   - may need medical optimization if EF still low post, may need cath  - tsh wnl     #prerenal- diet and exercise   #JANELLE- cpap in evening     DVT PPX: Lovenox 130mg SQ BID  GI PPX: none indicated  DIET: NPO  ACTIVITY: IAT  CODE STATUS: Full Code   32M w/ no known PMH presenting s/p pre-syncopal episode. Patient was in his usual state of health until earlier today when he suddenly developed lightheadedness, diaphoresis, sub-sternal chest discomfort, and generalized weakness. No reported LOC or HT. Episode lasted for a few minutes and then spontaneously resolved, but recurrent episodes intermittently occurring throughout day. No known aggravating or alleviating factors. Has never experienced anything similar before. No reported fevers, abdominal pain, n/v/d, dysuria, or LE swelling. No recent illnesses, sick contacts, changes in medications, or illicit/recreational drug use.    In ED, pt tachycardic (133) but otherwise HD stable on vitals. Labs showed D-Dimer 293 and initial troponin negative. EKG shows no acute ischemic changes. EKG officially read as NSR, but appears to be aflutter. CXR shows no acute cardiopulmonary disease. CTA Chest shows no acute PE. S/P 2L LR bolus, diltiazem 15mg IV x1, and Lovenox 110mg SQ x1. On telemetry, HR slowed down to 90's after diltiazem and underlying aflutter became more obvious. Subsequently admitted to telemetry for new onset aflutter.    Throughout hospitalization, pt attempted to obtain CCTA, however was unable to maintain adequate HR for a diagnostic study.    #Paroxysmal Atrial Flutter w/ RVR, new onset s/p cardioversion   #ACS, r/o  #Acute HFrEF 20% possible 2/2 aflutter   Initially p/w pre-syncope. Tachycardic on admission ('s). Orthostatic vitals negative. D-Dimer 293 and Tn neg x2. No acute ischemic changes on EKG, but did show aflutter and possible q waves in inferior leads. CHADS-VASC >0.   ACS w/u given pt's young age and possible presence of q waves on EKG. However, likely secondary to underlying untreated JANELLE/OHS. Pt is non compliant to CPAP  - c/w diltiazem 30mg PO QID (titrate up by HR)  - c/w Lovenox 130mg SQ BID (switch to DOAC on discharge), recieved one dose of apixiban today   - admit to telemetry  - check lipid panel, A1c, and TSH  - CCTA today, dw cardiology Dr. Maniatas, if unable to do CCTA, then cath today, pt is tentatively on the schedule, no need for repeat echo  - EP appreicted, dw cardiology, follow up recc, although CHADSvasc 1 now EP recc eliquis on dc   - may need medical optimization if EF still low post, may need cath  - tsh wnl   Discussed benefits and risks of starting anticoagulation to prevent strokes from Afib/Aflutter including risk of excessively bleeding gums or nose bleeds, hematuria,  hemorrhagic stroke, GI bleed,  excessive bleeding after trauma or cuts and even death. Advised seek medical intervention immediately. Pt decided to start anticoagulation given benefits outweighs risk.  Cath showed---------------        #prerenal- diet and exercise   #JANELLE- cpap in evening     DVT PPX: Lovenox 130mg SQ BID  GI PPX: none indicated  DIET: NPO  ACTIVITY: IAT  CODE STATUS: Full Code   32M w/ no known PMH presenting s/p pre-syncopal episode. Patient was in his usual state of health until earlier today when he suddenly developed lightheadedness, diaphoresis, sub-sternal chest discomfort, and generalized weakness. No reported LOC or HT. Episode lasted for a few minutes and then spontaneously resolved, but recurrent episodes intermittently occurring throughout day. No known aggravating or alleviating factors. Has never experienced anything similar before. No reported fevers, abdominal pain, n/v/d, dysuria, or LE swelling. No recent illnesses, sick contacts, changes in medications, or illicit/recreational drug use.    In ED, pt tachycardic (133) but otherwise HD stable on vitals. Labs showed D-Dimer 293 and initial troponin negative. EKG shows no acute ischemic changes. EKG officially read as NSR, but appears to be aflutter. CXR shows no acute cardiopulmonary disease. CTA Chest shows no acute PE. S/P 2L LR bolus, diltiazem 15mg IV x1, and Lovenox 110mg SQ x1. On telemetry, HR slowed down to 90's after diltiazem and underlying aflutter became more obvious. Subsequently admitted to telemetry for new onset aflutter. patient underwent GURINDER and cardioversion, GURINDER showed low LVEF 20%,  Cardia cath was negative,    A/P:   Paroxysmal Atrial Flutter with Rapid Ventricular Response:   s/p GURINDER and cardioversion, back to sinus rhythm  Likely due to sleep apnea and pulmonary HTN.   Continue Metoprolol and Eliquis.   EP follow up outpatient for possible ablation.     Acute HFrEF: new diagnosis.   Non-ischemic Cardiomyopathy, possibly tachycardia induced vs sleep apnea.   [id number], patient is obese, likely higher.   Euvolemic now.   GURINDER showed LVEF 20%, repeated echo 2/4 showed LVEF 36%.   Cardiac cath 2/3 showed minor irregularities.   Continue Metoprolol, start on Entresto.   Low sodium diet, cardiology follow up outpatient.     Obstructive sleep apnea:   continue CPAP at home.      Discussed benefits and risks of starting anticoagulation to prevent strokes from Afib/Aflutter including risk of excessively bleeding gums or nose bleeds, hematuria,  hemorrhagic stroke, GI bleed,  excessive bleeding after trauma or cuts and even death. Advised seek medical intervention immediately. Pt decided to start anticoagulation given benefits outweighs risk.          #prerenal- diet and exercise   #JANELLE- cpap in evening     DVT PPX: Lovenox 130mg SQ BID  GI PPX: none indicated  DIET: NPO  ACTIVITY: IAT  CODE STATUS: Full Code   32M w/ no known PMH presenting s/p pre-syncopal episode. Patient was in his usual state of health until earlier today when he suddenly developed lightheadedness, diaphoresis, sub-sternal chest discomfort, and generalized weakness. No reported LOC or HT. Episode lasted for a few minutes and then spontaneously resolved, but recurrent episodes intermittently occurring throughout day. No known aggravating or alleviating factors. Has never experienced anything similar before. No reported fevers, abdominal pain, n/v/d, dysuria, or LE swelling. No recent illnesses, sick contacts, changes in medications, or illicit/recreational drug use.    In ED, pt tachycardic (133) but otherwise HD stable on vitals. Labs showed D-Dimer 293 and initial troponin negative. EKG shows no acute ischemic changes. EKG officially read as NSR, but appears to be aflutter. CXR shows no acute cardiopulmonary disease. CTA Chest shows no acute PE. S/P 2L LR bolus, diltiazem 15mg IV x1, and Lovenox 110mg SQ x1. On telemetry, HR slowed down to 90's after diltiazem and underlying aflutter became more obvious. Subsequently admitted to telemetry for new onset aflutter. patient underwent GURINDER and cardioversion, GURINDER showed low LVEF 20%,  Cardia cath was negative,    A/P:   Paroxysmal Atrial Flutter with Rapid Ventricular Response:   s/p GURINDER and cardioversion, back to sinus rhythm  Likely due to sleep apnea and pulmonary HTN.   Continue Metoprolol and Eliquis.   EP follow up outpatient for possible ablation.     Acute HFrEF: new diagnosis.   Non-ischemic Cardiomyopathy, possibly tachycardia induced vs sleep apnea.   [id number], patient is obese, likely higher.   Euvolemic now.   GURINDER showed LVEF 20%, repeated echo 2/4 showed LVEF 36%.   Cardiac cath 2/3 showed minor irregularities.   Continue Metoprolol, start on Losartan 25mg (Entresto is not covered).    Low sodium diet, cardiology follow up outpatient.     Obstructive sleep apnea:   continue CPAP at home.      Discussed benefits and risks of starting anticoagulation to prevent strokes from Afib/Aflutter including risk of excessively bleeding gums or nose bleeds, hematuria,  hemorrhagic stroke, GI bleed,  excessive bleeding after trauma or cuts and even death. Advised seek medical intervention immediately. Pt decided to start anticoagulation given benefits outweighs risk.         32M w/ no known PMH presenting s/p pre-syncopal episode. Patient was in his usual state of health until earlier today when he suddenly developed lightheadedness, diaphoresis, sub-sternal chest discomfort, and generalized weakness. No reported LOC or HT. Episode lasted for a few minutes and then spontaneously resolved, but recurrent episodes intermittently occurring throughout day. No known aggravating or alleviating factors. Has never experienced anything similar before. No reported fevers, abdominal pain, n/v/d, dysuria, or LE swelling. No recent illnesses, sick contacts, changes in medications, or illicit/recreational drug use.    In ED, pt tachycardic (133) but otherwise HD stable on vitals. Labs showed D-Dimer 293 and initial troponin negative. EKG shows no acute ischemic changes. EKG officially read as NSR, but appears to be aflutter. CXR shows no acute cardiopulmonary disease. CTA Chest shows no acute PE. S/P 2L LR bolus, diltiazem 15mg IV x1, and Lovenox 110mg SQ x1. On telemetry, HR slowed down to 90's after diltiazem and underlying aflutter became more obvious. Subsequently admitted to telemetry for new onset aflutter. patient underwent GURINDER and cardioversion, GURINDER showed low LVEF 20%,  Cardia cath was negative,    A/P:   Paroxysmal Atrial Flutter with Rapid Ventricular Response:   s/p GURINDER and cardioversion, back to sinus rhythm  Likely due to sleep apnea and pulmonary HTN.   Continue Metoprolol and Eliquis.   EP follow up outpatient for possible ablation.     Acute HFrEF: new diagnosis.   Non-ischemic Cardiomyopathy, possibly tachycardia induced vs sleep apnea.   [id number], patient is obese, likely higher.   Euvolemic now.   GURINDER showed LVEF 20%, repeated echo 2/4 showed LVEF 36%.   Cardiac cath 2/3 showed minor irregularities.   Continue Metoprolol, start on Losartan 25mg (Entresto is not covered).  Outpatient cardiac MRI    Low sodium diet, cardiology follow up outpatient.     Obstructive sleep apnea:   continue CPAP at home.      Discussed benefits and risks of starting anticoagulation to prevent strokes from Afib/Aflutter including risk of excessively bleeding gums or nose bleeds, hematuria,  hemorrhagic stroke, GI bleed,  excessive bleeding after trauma or cuts and even death. Advised seek medical intervention immediately. Pt decided to start anticoagulation given benefits outweighs risk.

## 2023-02-03 NOTE — DISCHARGE NOTE PROVIDER - CARE PROVIDERS DIRECT ADDRESSES
,[email or web address]

## 2023-02-03 NOTE — CHART NOTE - NSCHARTNOTEFT_GEN_A_CORE
PRE-OP DIAGNOSIS:    Cardiomyopathy    PROCEDURE:     [x] Coronary Angiogram     [x] LHC     [] LVG     [] RHC     [] Intervention (see below)         PHYSICIAN:  Dr. Jade    ASSISTANT:  Dr. Anderson       PROCEDURE DESCRIPTION:     Consent:      [x] Patient     [] Family Member     []  Used        Anesthesia:     [] General     [x] Sedation     [x] Local        Access & Closure:     [x] 6 Fr right Radial Artery (D-stat)    [] Fr Femoral Artery     [] Fr Femoral Vein     [] Fr Brachial Vein       IV Contrast: 40 mL        Intervention: none      Implants: none       FINDINGS:     Coronary Dominance: right dominant      LM: minor luminal irregularities    LAD: minor luminal irregularities    CX: minor luminal irregularities    RCA: minor luminal irregularities      LVEDP: 33 mmHg     EF: <20% on 2d echo        ESTIMATED BLOOD LOSS: < 10 mL        CONDITION:     [x] Good     [] Fair     [] Critical        SPECIMEN REMOVED: N/A       POST-OP DIAGNOSIS:      [x] Minor luminal irregularities       PLAN OF CARE:     [x] Return to In-patient bed     [x] Medications:   - Resume Lovenox 130mg SQ Q12 tomorrow, if no plan for procedures may switch to DOAC  - c/w metoprolol    [x] IV Fluids: No IVF, given 40mg IV Lasix

## 2023-02-03 NOTE — CHART NOTE - NSCHARTNOTEFT_GEN_A_CORE
PREOPERATIVE DAY OF PROCEDURE EVALUATION:  I have personally seen and examined the patient.  I agree with the history and physical which I have reviewed and noted any changes below.     Bleeding Risk Score:   0.5%  -no IVF pre-hydration due to newly found decreased EF 20% ( Cr 1.0 on admission)  -pt received Lovenox 130mg at 6am today  -holding ASA     (Signed electronically by __________)  02-03-23 @ 17:39

## 2023-02-03 NOTE — DISCHARGE NOTE PROVIDER - NSDCCPCAREPLAN_GEN_ALL_CORE_FT
PRINCIPAL DISCHARGE DIAGNOSIS  Diagnosis: Atrial flutter  Assessment and Plan of Treatment: YOU WERE FOUND TO HAVE ATRIAL FLUTTER. YOU WERE SHOCKED TO REGAIN REGULAR RHYTHM. PLEASE FOLLOW UP WITH YOUR CARDIOLOGIST AND PRIMARY CARE DOCTOR WITHIN 1-2 WEEKS.  In atrial flutter, the heart's upper chambers (atria) beat too quickly. This causes the heart to beat in a fast, but usually regular, rhythm.  Atrial flutter is a type of heart rhythm disorder (arrhythmia) caused by problems in the heart's electrical system.  Atrial flutter is similar to atrial fibrillation, a common disorder that causes the heart to beat in irregular patterns. People with atrial flutter have a heart rhythm that's more organized and less chaotic than that of atrial fibrillation. Sometimes a person may have episodes of both atrial flutter and atrial fibrillation.  People with atrial flutter may not have symptoms. However, the disorder can increase the risk of stroke, heart failure and other complications. There are effective treatments for atrial flutter, including medication or procedures designed to scar small areas of heart tissue (ablation).       PRINCIPAL DISCHARGE DIAGNOSIS  Diagnosis: Acute heart failure  Assessment and Plan of Treatment: YOU WERE FOUND TO HAVE HEART FAILURE WITH ATRIAL FLUTTER. YOU WERE SHOCKED TO REGAIN REGULAR RHYTHM. PLEASE FOLLOW UP WITH YOUR CARDIOLOGIST AND PRIMARY CARE DOCTOR WITHIN 1-2 WEEKS.  Heart failure is a condition where the heart has trouble pumping blood. In some cases of heart failure, fluid may back up into your lungs or you may have swelling (edema) in your lower legs. There are many causes of heart failure including high blood pressure, coronary artery disease, abnormal heart valves, heart muscle disease, lung disease, diabetes, etc. Symptoms include shortness of breath with activity or when lying flat, cough, swelling of the legs, fatigue, or increased urination during the night.   Treatment is aimed at managing the symptoms of heart failure and may include lifestyle changes, medications, or surgical procedures. Take medicines only as directed by your health care provider and do not stop unless instructed to do so. Eat heart-healthy foods with low or no trans/saturated fats, cholesterol and salt. Weigh yourself every day for early recognition of fluid accumulation.  SEEK IMMEDIATE MEDICAL CARE IF YOU HAVE ANY OF THE FOLLOWING SYMPTOMS: shortness of breath, change in mental status, chest pain, lightheadedness/dizziness/fainting, or worsening of symptoms including not being able to conduct normal physical activity.      SECONDARY DISCHARGE DIAGNOSES  Diagnosis: Atrial flutter  Assessment and Plan of Treatment: YOU WERE FOUND TO HAVE ATRIAL FLUTTER. YOU WERE SHOCKED TO REGAIN REGULAR RHYTHM. PLEASE FOLLOW UP WITH YOUR CARDIOLOGIST AND PRIMARY CARE DOCTOR WITHIN 1-2 WEEKS.  In atrial flutter, the heart's upper chambers (atria) beat too quickly. This causes the heart to beat in a fast, but usually regular, rhythm.  Atrial flutter is a type of heart rhythm disorder (arrhythmia) caused by problems in the heart's electrical system.  Atrial flutter is similar to atrial fibrillation, a common disorder that causes the heart to beat in irregular patterns. People with atrial flutter have a heart rhythm that's more organized and less chaotic than that of atrial fibrillation. Sometimes a person may have episodes of both atrial flutter and atrial fibrillation.  People with atrial flutter may not have symptoms. However, the disorder can increase the risk of stroke, heart failure and other complications. There are effective treatments for atrial flutter, including medication or procedures designed to scar small areas of heart tissue (ablation).    Diagnosis: JANELLE on CPAP  Assessment and Plan of Treatment:      PRINCIPAL DISCHARGE DIAGNOSIS  Diagnosis: Acute heart failure  Assessment and Plan of Treatment: YOU WERE FOUND TO HAVE HEART FAILURE WITH ATRIAL FLUTTER. YOU WERE SHOCKED TO REGAIN REGULAR RHYTHM. PLEASE FOLLOW UP WITH YOUR CARDIOLOGIST AND PRIMARY CARE DOCTOR WITHIN 1-2 WEEKS.  Heart failure is a condition where the heart has trouble pumping blood. In some cases of heart failure, fluid may back up into your lungs or you may have swelling (edema) in your lower legs. There are many causes of heart failure including high blood pressure, coronary artery disease, abnormal heart valves, heart muscle disease, lung disease, diabetes, etc. Symptoms include shortness of breath with activity or when lying flat, cough, swelling of the legs, fatigue, or increased urination during the night.   Treatment is aimed at managing the symptoms of heart failure and may include lifestyle changes, medications, or surgical procedures. Take medicines only as directed by your health care provider and do not stop unless instructed to do so. Eat heart-healthy foods with low or no trans/saturated fats, cholesterol and salt. Weigh yourself every day for early recognition of fluid accumulation.  SEEK IMMEDIATE MEDICAL CARE IF YOU HAVE ANY OF THE FOLLOWING SYMPTOMS: shortness of breath, change in mental status, chest pain, lightheadedness/dizziness/fainting, or worsening of symptoms including not being able to conduct normal physical activity.      SECONDARY DISCHARGE DIAGNOSES  Diagnosis: Atrial flutter  Assessment and Plan of Treatment: YOU WERE FOUND TO HAVE ATRIAL FLUTTER. YOU WERE SHOCKED TO REGAIN REGULAR RHYTHM. PLEASE FOLLOW UP WITH YOUR CARDIOLOGIST AND PRIMARY CARE DOCTOR WITHIN 1-2 WEEKS.  In atrial flutter, the heart's upper chambers (atria) beat too quickly. This causes the heart to beat in a fast, but usually regular, rhythm.  Atrial flutter is a type of heart rhythm disorder (arrhythmia) caused by problems in the heart's electrical system.  Atrial flutter is similar to atrial fibrillation, a common disorder that causes the heart to beat in irregular patterns. People with atrial flutter have a heart rhythm that's more organized and less chaotic than that of atrial fibrillation. Sometimes a person may have episodes of both atrial flutter and atrial fibrillation.  People with atrial flutter may not have symptoms. However, the disorder can increase the risk of stroke, heart failure and other complications. There are effective treatments for atrial flutter, including medication or procedures designed to scar small areas of heart tissue (ablation).  Caution for signs of abnormal bleeding while on anticoagulation, this includes but is not limited to: excessively bleeding gums or nose bleeds, bleeding into urine, bleeding into stool (bright red blood or dark tarry colored stools), excessive bleeding after trauma or cuts. If this occur seek medical intervention immediately.    Diagnosis: JANELLE on CPAP  Assessment and Plan of Treatment:

## 2023-02-03 NOTE — DISCHARGE NOTE PROVIDER - PROVIDER TOKENS
PROVIDER:[TOKEN:[[id number]],FOLLOWUP:[1 week]] PROVIDER:[TOKEN:[[id number]:[1 week]],PROVIDER:[TOKEN:[[id number]],FOLLOWUP:[2 weeks]] PROVIDER:[TOKEN:[[id number]:[1 week]],PROVIDER:[TOKEN:[[id number]],FOLLOWUP:[2 weeks]]

## 2023-02-03 NOTE — DISCHARGE NOTE PROVIDER - NSDCFUADDAPPT_GEN_ALL_CORE_FT
APPTS ARE READY TO BE MADE: [ ] YES    Best Family or Patient Contact (if needed):    Additional Information about above appointments (if needed):    1: Dr. Montalvo  2:   3:     Other comments or requests:    APPTS ARE READY TO BE MADE: [X] YES    Best Family or Patient Contact (if needed):    Additional Information about above appointments (if needed):    1: Dr. Montalvo (cardiology)  2: Will need [facility] clinic appt if not already setup (has no PCP)  3:     Other comments or requests:    APPTS ARE READY TO BE MADE: [X] YES    Best Family or Patient Contact (if needed):    Additional Information about above appointments (if needed):    1: Dr. Montalvo (cardiology)  2: Will need [facility] MAP clinic appt if not already setup (has no PCP)  3: Will ideally need outpatient cardiac MRI prior to EP appt w/ Dr. Nino    Other comments or requests:

## 2023-02-03 NOTE — PROGRESS NOTE ADULT - SUBJECTIVE AND OBJECTIVE BOX
Patient is a 32y old  Male who presents with a chief complaint of pre-syncope (02-02-23)      Pt seen and examined at bedside. No CP or SOB.          PAST MEDICAL & SURGICAL HISTORY:  No pertinent past medical history    S/P appendectomy        VITAL SIGNS (Last 24 hrs):  T(C): 36.8 (02-03-23 @ 12:00), Max: 36.8 (02-02-23 @ 23:34)  HR: 72 (02-03-23 @ 12:00) (68 - 86)  BP: 125/88 (02-03-23 @ 12:00) (116/80 - 135/97)  RR: 14 (02-03-23 @ 08:00) (14 - 19)  SpO2: 96% (02-03-23 @ 12:00) (96% - 100%)  Wt(kg): --  Daily     Daily     I&O's Summary      PHYSICAL EXAM:  GENERAL: NAD, well-developed  HEAD:  Atraumatic, Normocephalic  EYES: EOMI, PERRLA, conjunctiva and sclera clear  NECK: Supple, No JVD  CHEST/LUNG: Clear to auscultation bilaterally; No wheeze  HEART: Regular rate and rhythm; No murmurs, rubs, or gallops  ABDOMEN: Soft, Nontender, Nondistended; Bowel sounds present  EXTREMITIES:  2+ Peripheral Pulses, No clubbing, cyanosis, or edema  PSYCH: AAOx3  NEUROLOGY: non-focal  SKIN: No rashes or lesions    Labs Reviewed  Spoke to patient in regards to abnormal labs.    CBC Full  -  ( 03 Feb 2023 07:42 )  WBC Count : 9.45 K/uL  Hemoglobin : 13.1 g/dL  Hematocrit : 40.1 %  Platelet Count - Automated : 230 K/uL  Mean Cell Volume : 86.2 fL  Mean Cell Hemoglobin : 28.2 pg  Mean Cell Hemoglobin Concentration : 32.7 g/dL  Auto Neutrophil # : x  Auto Lymphocyte # : x  Auto Monocyte # : x  Auto Eosinophil # : x  Auto Basophil # : x  Auto Neutrophil % : x  Auto Lymphocyte % : x  Auto Monocyte % : x  Auto Eosinophil % : x  Auto Basophil % : x    BMP:    02-03 @ 07:42    Blood Urea Nitrogen - 14  Calcium - 9.1  Carbond Dioxide - 23  Chloride - 102  Creatinine - 1.0  Glucose - 97  Potassium - 4.2  Sodium - 138      Hemoglobin A1c -     Urine Culture:        COVID Labs  CRP:      D-Dimer:  293 ng/mL DDU (01-31-23 @ 15:10)          MEDICATIONS  (STANDING):  enoxaparin Injectable 130 milliGRAM(s) SubCutaneous every 12 hours  metoprolol tartrate 25 milliGRAM(s) Oral two times a day  metoprolol tartrate IVPB 10 milliGRAM(s) IV Intermittent once    MEDICATIONS  (PRN):  acetaminophen     Tablet .. 650 milliGRAM(s) Oral every 6 hours PRN Temp greater or equal to 38C (100.4F), Mild Pain (1 - 3)

## 2023-02-03 NOTE — DISCHARGE NOTE PROVIDER - ATTENDING DISCHARGE PHYSICAL EXAMINATION:
Attending attestation  Attending DC note  Pt seen and examined at bedside. No cp or sob. Follow up   vitals, labs, exam stable  Hospital course as above.  Plan dw pt and agreed to plan  Medically cleared for DC. Med recc completed.  [facility] resident. Spent 32 mins on case

## 2023-02-03 NOTE — DISCHARGE NOTE PROVIDER - NSDCMRMEDTOKEN_GEN_ALL_CORE_FT
apixaban 5 mg oral tablet: 1 tab(s) orally 2 times a day    apixaban 5 mg oral tablet: 1 tab(s) orally 2 times a day   metoprolol succinate 50 mg oral capsule, extended release: 1 cap(s) orally once a day    apixaban 5 mg oral tablet: 1 tab(s) orally 2 times a day   losartan 25 mg oral tablet: 1 tab(s) orally once a day   metoprolol succinate 50 mg oral capsule, extended release: 1 cap(s) orally once a day

## 2023-02-03 NOTE — DISCHARGE NOTE PROVIDER - CARE PROVIDER_API CALL
Joseph Montalvo)  Cardiovascular Disease; Internal Medicine  [address] [place] [address]  Phone: [phone]  Fax: [phone]  Follow Up Time: 1 week   Joseph Montalvo)  Cardiovascular Disease; Internal Medicine  [address] [place] [address]  Phone: [phone]  Fax: [phone]  Follow Up Time: 1 week    Elmer Valdez)  Cardiac Electrophysiology; Cardiology  [address] [place] [address]  Phone: [phone]  Fax: [phone]  Follow Up Time: 2 weeks   Joseph Montalvo)  Cardiovascular Disease; Internal Medicine  [address] [place] [address], [place] [address]  Phone: [phone]  Fax: [phone]  Follow Up Time: 1 week    Adria Nino)  Cardiac Electrophysiology; Cardiovascular Disease; [facility]  [address] [place] [address]  Phone: [phone]  Fax: [phone]  Follow Up Time: 2 weeks

## 2023-02-04 ENCOUNTER — TRANSCRIPTION ENCOUNTER (OUTPATIENT)
Age: 33
End: 2023-02-04

## 2023-02-04 VITALS
RESPIRATION RATE: 17 BRPM | DIASTOLIC BLOOD PRESSURE: 88 MMHG | SYSTOLIC BLOOD PRESSURE: 146 MMHG | TEMPERATURE: 99 F | HEART RATE: 72 BPM

## 2023-02-04 LAB
ALBUMIN SERPL ELPH-MCNC: 4.2 G/DL — SIGNIFICANT CHANGE UP (ref 3.5–5.2)
ALP SERPL-CCNC: 120 U/L — HIGH (ref 30–115)
ALT FLD-CCNC: 26 U/L — SIGNIFICANT CHANGE UP (ref 0–41)
ANION GAP SERPL CALC-SCNC: 7 MMOL/L — SIGNIFICANT CHANGE UP (ref 7–14)
AST SERPL-CCNC: 27 U/L — SIGNIFICANT CHANGE UP (ref 0–41)
BASOPHILS # BLD AUTO: 0.03 K/UL — SIGNIFICANT CHANGE UP (ref 0–0.2)
BASOPHILS NFR BLD AUTO: 0.3 % — SIGNIFICANT CHANGE UP (ref 0–1)
BILIRUB SERPL-MCNC: 1.1 MG/DL — SIGNIFICANT CHANGE UP (ref 0.2–1.2)
BUN SERPL-MCNC: 17 MG/DL — SIGNIFICANT CHANGE UP (ref 10–20)
CALCIUM SERPL-MCNC: 9.4 MG/DL — SIGNIFICANT CHANGE UP (ref 8.4–10.4)
CHLORIDE SERPL-SCNC: 102 MMOL/L — SIGNIFICANT CHANGE UP (ref 98–110)
CO2 SERPL-SCNC: 31 MMOL/L — SIGNIFICANT CHANGE UP (ref 17–32)
CREAT SERPL-MCNC: 1.1 MG/DL — SIGNIFICANT CHANGE UP (ref 0.7–1.5)
EGFR: 91 ML/MIN/1.73M2 — SIGNIFICANT CHANGE UP
EOSINOPHIL # BLD AUTO: 0.25 K/UL — SIGNIFICANT CHANGE UP (ref 0–0.7)
EOSINOPHIL NFR BLD AUTO: 2.9 % — SIGNIFICANT CHANGE UP (ref 0–8)
GLUCOSE SERPL-MCNC: 92 MG/DL — SIGNIFICANT CHANGE UP (ref 70–99)
HCT VFR BLD CALC: 42.1 % — SIGNIFICANT CHANGE UP (ref 42–52)
HGB BLD-MCNC: 13.7 G/DL — LOW (ref 14–18)
IMM GRANULOCYTES NFR BLD AUTO: 0.3 % — SIGNIFICANT CHANGE UP (ref 0.1–0.3)
LYMPHOCYTES # BLD AUTO: 2.02 K/UL — SIGNIFICANT CHANGE UP (ref 1.2–3.4)
LYMPHOCYTES # BLD AUTO: 23.2 % — SIGNIFICANT CHANGE UP (ref 20.5–51.1)
MAGNESIUM SERPL-MCNC: 2.2 MG/DL — SIGNIFICANT CHANGE UP (ref 1.8–2.4)
MCHC RBC-ENTMCNC: 27.3 PG — SIGNIFICANT CHANGE UP (ref 27–31)
MCHC RBC-ENTMCNC: 32.5 G/DL — SIGNIFICANT CHANGE UP (ref 32–37)
MCV RBC AUTO: 84 FL — SIGNIFICANT CHANGE UP (ref 80–94)
MONOCYTES # BLD AUTO: 0.67 K/UL — HIGH (ref 0.1–0.6)
MONOCYTES NFR BLD AUTO: 7.7 % — SIGNIFICANT CHANGE UP (ref 1.7–9.3)
NEUTROPHILS # BLD AUTO: 5.72 K/UL — SIGNIFICANT CHANGE UP (ref 1.4–6.5)
NEUTROPHILS NFR BLD AUTO: 65.6 % — SIGNIFICANT CHANGE UP (ref 42.2–75.2)
NRBC # BLD: 0 /100 WBCS — SIGNIFICANT CHANGE UP (ref 0–0)
PLATELET # BLD AUTO: 236 K/UL — SIGNIFICANT CHANGE UP (ref 130–400)
POTASSIUM SERPL-MCNC: 4.3 MMOL/L — SIGNIFICANT CHANGE UP (ref 3.5–5)
POTASSIUM SERPL-SCNC: 4.3 MMOL/L — SIGNIFICANT CHANGE UP (ref 3.5–5)
PROT SERPL-MCNC: 7.1 G/DL — SIGNIFICANT CHANGE UP (ref 6–8)
RBC # BLD: 5.01 M/UL — SIGNIFICANT CHANGE UP (ref 4.7–6.1)
RBC # FLD: 13.5 % — SIGNIFICANT CHANGE UP (ref 11.5–14.5)
SODIUM SERPL-SCNC: 140 MMOL/L — SIGNIFICANT CHANGE UP (ref 135–146)
WBC # BLD: 8.72 K/UL — SIGNIFICANT CHANGE UP (ref 4.8–10.8)
WBC # FLD AUTO: 8.72 K/UL — SIGNIFICANT CHANGE UP (ref 4.8–10.8)

## 2023-02-04 PROCEDURE — 93306 TTE W/DOPPLER COMPLETE: CPT | Mod: 26

## 2023-02-04 PROCEDURE — 99239 HOSP IP/OBS DSCHRG MGMT >30: CPT

## 2023-02-04 RX ORDER — LOSARTAN POTASSIUM 100 MG/1
1 TABLET, FILM COATED ORAL
Qty: 30 | Refills: 1
Start: 2023-02-04 | End: 2023-04-04

## 2023-02-04 RX ORDER — APIXABAN 2.5 MG/1
1 TABLET, FILM COATED ORAL
Qty: 60 | Refills: 1
Start: 2023-02-04 | End: 2023-04-04

## 2023-02-04 RX ORDER — SACUBITRIL AND VALSARTAN 24; 26 MG/1; MG/1
1 TABLET, FILM COATED ORAL
Refills: 0 | Status: DISCONTINUED | OUTPATIENT
Start: 2023-02-04 | End: 2023-02-04

## 2023-02-04 RX ORDER — SACUBITRIL AND VALSARTAN 24; 26 MG/1; MG/1
1 TABLET, FILM COATED ORAL
Qty: 60 | Refills: 1
Start: 2023-02-04 | End: 2023-04-04

## 2023-02-04 RX ADMIN — Medication 25 MILLIGRAM(S): at 06:13

## 2023-02-04 RX ADMIN — ENOXAPARIN SODIUM 130 MILLIGRAM(S): 100 INJECTION SUBCUTANEOUS at 06:13

## 2023-02-04 NOTE — DISCHARGE NOTE NURSING/CASE MANAGEMENT/SOCIAL WORK - NSDCFUADDAPPT_GEN_ALL_CORE_FT
APPTS ARE READY TO BE MADE: [ ] YES    Best Family or Patient Contact (if needed):    Additional Information about above appointments (if needed):    1: Dr. Montalvo  2:   3:     Other comments or requests:

## 2023-02-04 NOTE — PROGRESS NOTE ADULT - SUBJECTIVE AND OBJECTIVE BOX
JESSIE RIVERA  32y  Male      Patient is a 32y old  Male who presents with a chief complaint of pre-syncope (03 Feb 2023 14:18)      INTERVAL HPI/OVERNIGHT EVENTS:  He feels ok, no chest pain or SOB, no telemetry event, he went for repeated echo.   Vital Signs Last 24 Hrs  T(C): 36.4 (04 Feb 2023 04:50), Max: 36.8 (03 Feb 2023 12:00)  T(F): 97.6 (04 Feb 2023 04:50), Max: 98.3 (03 Feb 2023 12:00)  HR: 69 (04 Feb 2023 06:12) (56 - 80)  BP: 124/93 (04 Feb 2023 06:12) (109/68 - 127/89)  BP(mean): 104 (03 Feb 2023 15:26) (104 - 104)  RR: 18 (04 Feb 2023 06:12) (18 - 18)  SpO2: 99% (03 Feb 2023 22:09) (96% - 99%)    Parameters below as of 03 Feb 2023 22:09  Patient On (Oxygen Delivery Method): room air          02-03-23 @ 07:01  -  02-04-23 @ 07:00  --------------------------------------------------------  IN: 360 mL / OUT: 0 mL / NET: 360 mL    02-04-23 @ 07:01  -  02-04-23 @ 11:04  --------------------------------------------------------  IN: 236 mL / OUT: 0 mL / NET: 236 mL            Consultant(s) Notes Reviewed:  [x ] YES  [ ] NO          MEDICATIONS  (STANDING):  enoxaparin Injectable 130 milliGRAM(s) SubCutaneous every 12 hours  metoprolol tartrate 25 milliGRAM(s) Oral two times a day  sacubitril 24 mG/valsartan 26 mG 1 Tablet(s) Oral two times a day    MEDICATIONS  (PRN):  acetaminophen     Tablet .. 650 milliGRAM(s) Oral every 6 hours PRN Temp greater or equal to 38C (100.4F), Mild Pain (1 - 3)      LABS                          13.7   8.72  )-----------( 236      ( 04 Feb 2023 06:50 )             42.1     02-04    140  |  102  |  17  ----------------------------<  92  4.3   |  31  |  1.1    Ca    9.4      04 Feb 2023 06:50  Mg     2.2     02-04    TPro  7.1  /  Alb  4.2  /  TBili  1.1  /  DBili  x   /  AST  27  /  ALT  26  /  AlkPhos  120<H>  02-04          Lactate Trend        CAPILLARY BLOOD GLUCOSE            RADIOLOGY & ADDITIONAL TESTS:    Imaging Personally Reviewed:  [ ] YES  [ ] NO    HEALTH ISSUES - PROBLEM Dx:          PHYSICAL EXAM:  GENERAL: NAD, well-developed.  HEAD:  Atraumatic, Normocephalic.  EYES: EOMI, PERRLA, conjunctiva and sclera clear.  NECK: Supple, No JVD.  CHEST/LUNG: Clear to auscultation bilaterally; No wheeze.  HEART: Regular rate and rhythm; S1 S2. S3 Gallop rhythm.   ABDOMEN: Soft, Nontender, Nondistended; Bowel sounds present.  EXTREMITIES:  2+ Peripheral Pulses, No clubbing, cyanosis, or edema.  PSYCH: AAOx3.  NEUROLOGY: non-focal.  SKIN: No rashes or lesions.

## 2023-02-04 NOTE — CHART NOTE - NSCHARTNOTEFT_GEN_A_CORE
Electrophysiology PA Note     Cath report noted  repeat Echo EF 36% up from 20% on Thu  con't GDMT  cardiac MRI as out-pt  follow up with Dr Nino in 1 month

## 2023-02-04 NOTE — DISCHARGE NOTE NURSING/CASE MANAGEMENT/SOCIAL WORK - PATIENT PORTAL LINK FT
You can access the FollowMyHealth Patient Portal offered by [facility] by registering at the following website: [email or web address]. By joining [facility]’s FollowMyHealth portal, you will also be able to view your health information using other applications (apps) compatible with our system.

## 2023-02-04 NOTE — PROGRESS NOTE ADULT - ASSESSMENT
32M w/ no known PMH presenting s/p syncopal episode and found to be persistently tachycardic. Admitted to telemetry for new onset aflutter.    #Atrial Flutter w/ RVR, new onset  #ACS, r/o  Initially p/w pre-syncope. Tachycardic on admission ('s). Orthostatic vitals negative. D-Dimer 293 and Tn neg x2. No acute ischemic changes on EKG, but did show aflutter and possible q waves in inferior leads. CHADS-VASC >0.   ACS w/u given pt's young age and possible presence of q waves on EKG. However, likely secondary to underlying untreated JANELLE/OHS.  - c/w diltiazem 30mg PO QID (titrate up by HR)  - c/w Lovenox 130mg SQ BID (switch to DOAC on discharge)  - admit to telemetry  - check lipid panel, A1c, and TSH  - obtain TTE, pending   - NPO after midnight for GURINDER/DCCV today  - cardiology consulted; f/u recommendations   - EP consulted; f/u recs    DVT PPX: Lovenox 130mg SQ BID  GI PPX: none indicated  DIET: NPO  ACTIVITY: IAT  CODE STATUS: Full Code    follow up: Acute. GURINDER today, follow cardiology EP, Follow TSH.   
32M w/ no known PMH presenting s/p syncopal episode and found to be persistently tachycardic. Admitted to telemetry for new onset aflutter.    #Paroxysmal Atrial Flutter w/ RVR, new onset s/p cardioversion   #ACS, r/o  #Acute HFrEF 20% possible 2/2 aflutter   Initially p/w pre-syncope. Tachycardic on admission ('s). Orthostatic vitals negative. D-Dimer 293 and Tn neg x2. No acute ischemic changes on EKG, but did show aflutter and possible q waves in inferior leads. CHADS-VASC >0.   ACS w/u given pt's young age and possible presence of q waves on EKG. However, likely secondary to underlying untreated JANELLE/OHS. Pt is non compliant to CPAP  - c/w diltiazem 30mg PO QID (titrate up by HR)  - c/w Lovenox 130mg SQ BID (switch to DOAC on discharge), recieved one dose of apixiban today   - admit to telemetry  - check lipid panel, A1c, and TSH  - CCTA today, dw cardiology Dr. Palacios, if unable to do CCTA, then cath today, pt is tentatively on the schedule, no need for repeat echo  - EP appreicted, dw cardiology, follow up recc, although CHADSvasc 1 now EP recc eliquis on dc   - may need medical optimization if EF still low post, may need cath  - tsh wnl     #prerenal- diet and exercise   #JANELLE- cpap in evening     DVT PPX: Lovenox 130mg SQ BID  GI PPX: none indicated  DIET: NPO  ACTIVITY: IAT  CODE STATUS: Full Code    #Progress Note Handoff  Pending (specify):  follow up ccta  Disposition: home possible today   Decision to admit the pt is based on acuity as above     
32M w/ no known PMH presenting s/p syncopal episode and found to be persistently tachycardic. Admitted to telemetry for new onset aflutter.    A/P:   Paroxysmal Atrial Flutter with Rapid Ventricular Response:   s/p GURINDER and cardioversion, back to sinus rhythm  Likely due to sleep apnea and pulmonary HTN.   Continue Metoprolol and Eliquis.   EP follow up outpatient for possible ablation.     Acute HFrEF: new diagnosis.   Non-ischemic Cardiomyopathy, possibly tachycardia induced vs sleep apnea.   [id number], patient is obese, likely higher.   Euvolemic now.   GURINDER showed LVEF 20%, repeated echo 2/4 showed LVEF 36%.   Cardiac cath 2/3 showed minor irregularities.   Continue Metoprolol, start on Entresto.   Low sodium diet, cardiology follow up outpatient.     Obstructive sleep apnea:   continue CPAP at home.      Discharge home today. 
32M w/ no known PMH presenting s/p syncopal episode and found to be persistently tachycardic. Admitted to telemetry for new onset aflutter.    #Paroxysmal Atrial Flutter w/ RVR, new onset s/p cardiology version   #ACS, r/o  #Acute HFrEF 20% possible 2/2 aflutter   Initially p/w pre-syncope. Tachycardic on admission ('s). Orthostatic vitals negative. D-Dimer 293 and Tn neg x2. No acute ischemic changes on EKG, but did show aflutter and possible q waves in inferior leads. CHADS-VASC >0.   ACS w/u given pt's young age and possible presence of q waves on EKG. However, likely secondary to underlying untreated JANELLE/OHS. Pt is non compliant to CPAP  - c/w diltiazem 30mg PO QID (titrate up by HR)  - c/w Lovenox 130mg SQ BID (switch to DOAC on discharge), recieved one dose of apixiban today   - admit to telemetry  - check lipid panel, A1c, and TSH  - CCTA today   - repeat Echo post GURINDER  - EP appreicted, dw cardiology, follow up recc, although CHADSvasc 1 now EP recc eliquis on dc   - may need medical optimization if EF still low post, may need cath  - tsh wnl     #prerenal- diet and exercise   #JANELLE- cpap in evening     DVT PPX: Lovenox 130mg SQ BID  GI PPX: none indicated  DIET: NPO  ACTIVITY: IAT  CODE STATUS: Full Code    #Progress Note Handoff  Pending (specify):  follow up ccta and echo   Disposition: home   Decision to admit the pt is based on acuity as above   High risk given above acuity and comorbidities

## 2023-02-04 NOTE — DISCHARGE NOTE NURSING/CASE MANAGEMENT/SOCIAL WORK - NSDCPEFALRISK_GEN_ALL_CORE
For information on Fall & Injury Prevention, visit: [email or web address] OR  [email or web address] OR  https://www.cdc.gov/steadi/patient.html

## 2023-02-08 DIAGNOSIS — F10.90 ALCOHOL USE, UNSPECIFIED, UNCOMPLICATED: ICD-10-CM

## 2023-02-08 DIAGNOSIS — R00.0 TACHYCARDIA, UNSPECIFIED: ICD-10-CM

## 2023-02-08 DIAGNOSIS — I48.92 UNSPECIFIED ATRIAL FLUTTER: ICD-10-CM

## 2023-02-08 DIAGNOSIS — Z91.198 PATIENT'S NONCOMPLIANCE WITH OTHER MEDICAL TREATMENT AND REGIMEN FOR OTHER REASON: ICD-10-CM

## 2023-02-08 DIAGNOSIS — I25.5 ISCHEMIC CARDIOMYOPATHY: ICD-10-CM

## 2023-02-08 DIAGNOSIS — G47.33 OBSTRUCTIVE SLEEP APNEA (ADULT) (PEDIATRIC): ICD-10-CM

## 2023-02-08 DIAGNOSIS — Z82.69 FAMILY HISTORY OF OTHER DISEASES OF THE MUSCULOSKELETAL SYSTEM AND CONNECTIVE TISSUE: ICD-10-CM

## 2023-02-08 DIAGNOSIS — R07.9 CHEST PAIN, UNSPECIFIED: ICD-10-CM

## 2023-02-08 DIAGNOSIS — R55 SYNCOPE AND COLLAPSE: ICD-10-CM

## 2023-02-08 DIAGNOSIS — E66.9 OBESITY, UNSPECIFIED: ICD-10-CM

## 2023-02-08 DIAGNOSIS — I27.20 PULMONARY HYPERTENSION, UNSPECIFIED: ICD-10-CM

## 2023-02-08 DIAGNOSIS — I50.31 ACUTE DIASTOLIC (CONGESTIVE) HEART FAILURE: ICD-10-CM

## 2023-02-13 ENCOUNTER — APPOINTMENT (OUTPATIENT)
Dept: CARDIOLOGY | Facility: CLINIC | Age: 33
End: 2023-02-13
Payer: MEDICAID

## 2023-02-13 ENCOUNTER — RESULT CHARGE (OUTPATIENT)
Age: 33
End: 2023-02-13

## 2023-02-13 ENCOUNTER — TRANSCRIPTION ENCOUNTER (OUTPATIENT)
Age: 33
End: 2023-02-13

## 2023-02-13 VITALS
WEIGHT: 293 LBS | HEART RATE: 116 BPM | SYSTOLIC BLOOD PRESSURE: 104 MMHG | BODY MASS INDEX: 43.4 KG/M2 | HEIGHT: 69 IN | DIASTOLIC BLOOD PRESSURE: 70 MMHG | TEMPERATURE: 97.1 F

## 2023-02-13 PROCEDURE — 93000 ELECTROCARDIOGRAM COMPLETE: CPT

## 2023-02-13 PROCEDURE — 99214 OFFICE O/P EST MOD 30 MIN: CPT | Mod: 25

## 2023-02-13 NOTE — PHYSICAL EXAM
[de-identified] : onese, no distress [de-identified] : reg, nL s1/s2, no m/r/g [de-identified] : CTA [de-identified] : warm, no edema [de-identified] : alert, normal affect, logical conversation

## 2023-02-13 NOTE — HISTORY OF PRESENT ILLNESS
[FreeTextEntry1] : 32 year-old male hospitalized few weeks ago.\par \par Near syncope.\par Newly diagnosed AFL (rapid), cardiomyopathy, and CHF.\par \par s/p GURINDER + DCCV\par No significant CAD on cath.\par \par Recurrent AT v. AFL today.  Mild tachycardia.\par \par Feels well despite recurrent arrhythmias.  ET better / able to walk up hill that previously gave him trouble.  Returned to work (maintenance).  No exertional symptoms.\par \par No chest pain.  Breathing comfortable.  Mild lightheadedness is rises from bed quickly.  No other lightheadedness / syncope.\par \par Now CPAP compliant.\par \par ECHO: Dilated LV with biatrial enlargement.  Severe LV dysfunction.  Moderate RV dysfunction.\par \par \par PMH / PSH:\par JANELLE\par Appendectomy\par  \par FAMILY:\par ALS (father, sibling)

## 2023-02-13 NOTE — DISCUSSION/SUMMARY
[FreeTextEntry1] : Cont Toprol\par Cont Eliquis\par Cont Losartan\par Start Amiodarone\par DCCV this week\par Cardiac MRI\par Follow-up Dr. Nino / plan for ablation\par Follow-up 6-weeks\par \par Discussed with EUNICE Nino

## 2023-02-13 NOTE — ASSESSMENT
[FreeTextEntry1] : NICM (severe LV dysfunction)\par Possibly tachycardia mediated\par \par AFL s/p DCCV\par Recurrent AFL v. AT today (mild tachycardia / asymptomatic)\par \par Chronic Systolic CHF\par Compensated

## 2023-02-14 ENCOUNTER — LABORATORY RESULT (OUTPATIENT)
Age: 33
End: 2023-02-14

## 2023-02-15 ENCOUNTER — OUTPATIENT (OUTPATIENT)
Dept: INPATIENT UNIT | Facility: HOSPITAL | Age: 33
LOS: 1 days | Discharge: HOME | End: 2023-02-15
Payer: MEDICAID

## 2023-02-15 VITALS
DIASTOLIC BLOOD PRESSURE: 57 MMHG | HEART RATE: 97 BPM | WEIGHT: 292.99 LBS | SYSTOLIC BLOOD PRESSURE: 103 MMHG | HEIGHT: 69 IN | OXYGEN SATURATION: 97 % | RESPIRATION RATE: 16 BRPM

## 2023-02-15 DIAGNOSIS — I48.92 UNSPECIFIED ATRIAL FLUTTER: ICD-10-CM

## 2023-02-15 DIAGNOSIS — I48.91 UNSPECIFIED ATRIAL FIBRILLATION: ICD-10-CM

## 2023-02-15 DIAGNOSIS — Z90.49 ACQUIRED ABSENCE OF OTHER SPECIFIED PARTS OF DIGESTIVE TRACT: Chronic | ICD-10-CM

## 2023-02-15 PROCEDURE — 92960 CARDIOVERSION ELECTRIC EXT: CPT

## 2023-02-15 PROCEDURE — 93010 ELECTROCARDIOGRAM REPORT: CPT

## 2023-02-15 PROCEDURE — 93005 ELECTROCARDIOGRAM TRACING: CPT

## 2023-02-15 NOTE — H&P CARDIOLOGY - HISTORY OF PRESENT ILLNESS
32 y.o male patient with recent diagnosis of HFrEF and atrial flutter, and PMH of JANELLE on CPAP presents for cardioversion. Patient underwent GURINDER on 2/1/2023 with no evidence of thrombus and underwent successful cardioversion to normal sinus rhythm. He's been on Eliquis BID and hasn't missed a dose since discharge; took the dose of Eliquis this morning. He feels well, no complaints.  He was found to be in atrial flutter again and is now scheduled for DCCV.    REVIEW OF SYSTEMS:  CONSTITUTIONAL: No weakness, fevers or chills  EYES/ENT: No visual changes;  No vertigo or throat pain   NECK: No pain or stiffness  RESPIRATORY: No cough, wheezing, hemoptysis; SEE HPI  CARDIOVASCULAR: SEE HPI  GASTROINTESTINAL: No abdominal or epigastric pain. No nausea, vomiting, or hematemesis; No diarrhea or constipation. No melena or hematochezia.  GENITOURINARY: No dysuria, frequency or hematuria  NEUROLOGICAL: No numbness or weakness  SKIN: No itching, rashes      PHYSICAL EXAM:  GENERAL: NAD  HEAD:  Atraumatic, Normocephalic  EYES: conjunctiva and sclera clear  NECK: No JVD  CHEST/LUNG: Clear to auscultation bilaterally  HEART: Irregular  ABDOMEN: Soft, Nontender, Nondistended; Bowel sounds present  EXTREMITIES: No edema  NEUROLOGY:  A&Ox3, appropriate  SKIN: No rashes or lesions

## 2023-02-15 NOTE — ASU PATIENT PROFILE, ADULT - NSICDXPASTMEDICALHX_GEN_ALL_CORE_FT
PAST MEDICAL HISTORY:  Afib     CHF (congestive heart failure)     JANELLE (obstructive sleep apnea)

## 2023-03-14 ENCOUNTER — APPOINTMENT (OUTPATIENT)
Dept: ELECTROPHYSIOLOGY | Facility: CLINIC | Age: 33
End: 2023-03-14
Payer: MEDICAID

## 2023-03-14 VITALS
HEART RATE: 70 BPM | HEIGHT: 69 IN | BODY MASS INDEX: 43.69 KG/M2 | TEMPERATURE: 97.6 F | DIASTOLIC BLOOD PRESSURE: 72 MMHG | WEIGHT: 295 LBS | SYSTOLIC BLOOD PRESSURE: 118 MMHG

## 2023-03-14 DIAGNOSIS — Z78.9 OTHER SPECIFIED HEALTH STATUS: ICD-10-CM

## 2023-03-14 DIAGNOSIS — Z82.0 FAMILY HISTORY OF EPILEPSY AND OTHER DISEASES OF THE NERVOUS SYSTEM: ICD-10-CM

## 2023-03-14 PROBLEM — I48.91 UNSPECIFIED ATRIAL FIBRILLATION: Chronic | Status: ACTIVE | Noted: 2023-02-15

## 2023-03-14 PROBLEM — G47.33 OBSTRUCTIVE SLEEP APNEA (ADULT) (PEDIATRIC): Chronic | Status: ACTIVE | Noted: 2023-02-15

## 2023-03-14 PROBLEM — I50.9 HEART FAILURE, UNSPECIFIED: Chronic | Status: ACTIVE | Noted: 2023-02-15

## 2023-03-14 PROCEDURE — 99215 OFFICE O/P EST HI 40 MIN: CPT | Mod: 25

## 2023-03-14 PROCEDURE — 93000 ELECTROCARDIOGRAM COMPLETE: CPT

## 2023-03-14 NOTE — DISCUSSION/SUMMARY
[EKG obtained to assist in diagnosis and management of assessed problem(s)] : EKG obtained to assist in diagnosis and management of assessed problem(s) [FreeTextEntry1] : Mr. Thony Conte is a pleasant 32-year-old man with Persistent Atrial Flutter/Atrial Fibrillation, New Onset Cardiomyopathy, Obstructive Sleep Apnea on CPAP, presenting to discuss management of Atrial Fibrillation and Atrial Flutter. Patient has most likely Tachycardia induced Cardiomyopathy leading to Tachycardia induced Cardiomyopathy. \par \par Patient is currently in sinus rhythm after 2 Cardioversion. He is on Amiodarone to maintain sinus rhythm. \par \par I recommend to continue same medication. I discuss with patient at length importance of compliance with Eliquis. I recommend to continue Eliquis as patient CHADSVASC Score is 1 and he is planned for a Catheter Ablation.\par \par The management strategies for atrial fibrillation were discussed focusing on the issues of stroke prevention, heart rate control and rhythm control. The options of rate control, antiarrhythmic drug therapy, and electrophysiologic testing and catheter ablation therapy were discussed at length. As he is not keen on long term antiarrhythmic medication, he is a good candidate for catheter ablation of atrial fibrillation in the form of pulmonary vein isolation. I have discussed the procedure with him in great detail. He was told this procedure is performed under anesthesia with the duration of about four hours. Possible complications include but not limited to bleeding, vascular injury, groin complications, cardiac tamponade, stroke, esophageal injury, pulmonary vein stenosis, need for pacemaker, need for cardiac surgery, and rare risks of esophageal fistula/stroke/heart attack/death. Intracardiac echo is used to monitor the procedure. In addition, we use a temperature probe in the esophagus to prevent lesions. The success rate is in the range 70-80%. About 20% of patients require a second procedure for pulmonary vein reconnection. \par \par Procedure will be planned on 4/12/2023 (PVI - CTI - a-stim to induce flutter)\par \par He verbalized understanding of the discussion and all questions were addressed and answered. He expressed agreement in proceeding with EP study and ablation. My  will be in contact with her for finalizing date, preadmission testing and instructions. Patient will follow with me in 2 months’ time. Please do not hesitate to contact me at [phone] if you have any further questions regarding this patient care.\par \par \par

## 2023-03-14 NOTE — ADDENDUM
[FreeTextEntry1] : [employer] assisted in documentation on 03/14/2023 acting as a scribe for Mica Davis.\par

## 2023-03-14 NOTE — REASON FOR VISIT
[Arrhythmia/ECG Abnorrmalities] : arrhythmia/ECG abnormalities [FreeTextEntry3] : Dr. Joseph Montalvo

## 2023-03-14 NOTE — END OF VISIT
[Time Spent: ___ minutes] : I have spent [unfilled] minutes of time on the encounter. [FreeTextEntry3] : I, Adria Nino, personally performed the services described in this documentation. All medical record entries made by the scribe/nurse CTA were at my direction and in my presence. I have reviewed the chart and agree that the record reflects my personal performance and is accurate and complete.\par

## 2023-03-14 NOTE — CARDIOLOGY SUMMARY
[de-identified] : (3/14/2023): ECG sinus rhythm at 70 BPM, left atrial enlargement, non-specific T-wave abnormalities.   [de-identified] : (2/4/2023): 2D Echo moderately decreased LV systolic function. EF 36%. Bi-atrial enlargement. Mild TR.  [de-identified] : (2/3/2023): Coronary angiography demonstrate minor luminal irregularities.

## 2023-03-14 NOTE — HISTORY OF PRESENT ILLNESS
[FreeTextEntry1] : Patient presented to [facility] on January 31st, 2023 for Pre-syncope. He was diagnosed with New Onset Non-Ischemic Cardiomyopathy with EF of 36%, no significant SOPHIE on Cath, and New Onset Persistent Atrial Fibrillation and Atrial Flutter with RVR. Patient underwent successful GURINDER and Cardioversion on 2/4/2023 as was discharge home. On follow-up, patient was found back in Atrial Flutter with RVR. He underwent Cardioversion on 2/15/2023 with initiation of Amiodarone.\par \par Patient ran out of [employer] last week and at this point is taking Metoprolol, Losartan and Amiodarone.\par \par Since patient is in sinus rhythm, he is feeling much better.\par \par Patient has no chest pain, no shortness of breath at rest, no dyspnea on exertion, no lightheadedness, no dizziness, and no syncope. He present for management of Atrial Fibrillation and Atrial Flutter.

## 2023-03-20 ENCOUNTER — APPOINTMENT (OUTPATIENT)
Dept: CARDIOLOGY | Facility: CLINIC | Age: 33
End: 2023-03-20
Payer: MEDICAID

## 2023-03-20 VITALS
TEMPERATURE: 98.3 F | BODY MASS INDEX: 44.28 KG/M2 | SYSTOLIC BLOOD PRESSURE: 124 MMHG | DIASTOLIC BLOOD PRESSURE: 80 MMHG | WEIGHT: 299 LBS | HEIGHT: 69 IN

## 2023-03-20 VITALS — HEART RATE: 53 BPM

## 2023-03-20 PROCEDURE — 99214 OFFICE O/P EST MOD 30 MIN: CPT | Mod: 25

## 2023-03-20 PROCEDURE — 93000 ELECTROCARDIOGRAM COMPLETE: CPT

## 2023-03-20 NOTE — DISCUSSION/SUMMARY
[FreeTextEntry1] : Cont Toprol\par Cont Eliquis\par Cont Losartan\par Cont Amiodarone\par Ablation / Cardiac MRI as planned\par Follow-up 3-weeks

## 2023-03-20 NOTE — ASSESSMENT
[FreeTextEntry1] : NICM (severe LV dysfunction)\par Possibly tachycardia mediated\par \par AFL s/p DCCV\par Recurrent AFL v. AT s/p DCCV\par \par Chronic Systolic CHF\par Compensated

## 2023-03-20 NOTE — REASON FOR VISIT
[FreeTextEntry1] : Feels well.\par \par s/p DCCV.  Remains SR.  Ablation scheduled next month.\par \par Breathing better.  Able to walk stairs more comfortably.\par \par Tolerating Rx.

## 2023-03-20 NOTE — PHYSICAL EXAM
[de-identified] : onese, no distress [de-identified] : reg, nL s1/s2, no m/r/g [de-identified] : CTA [de-identified] : warm, no edema [de-identified] : alert, normal affect, logical conversation

## 2023-03-29 ENCOUNTER — APPOINTMENT (OUTPATIENT)
Dept: PULMONOLOGY | Facility: CLINIC | Age: 33
End: 2023-03-29
Payer: MEDICAID

## 2023-03-29 VITALS
WEIGHT: 299 LBS | HEIGHT: 69 IN | BODY MASS INDEX: 44.28 KG/M2 | RESPIRATION RATE: 14 BRPM | SYSTOLIC BLOOD PRESSURE: 140 MMHG | HEART RATE: 72 BPM | OXYGEN SATURATION: 98 % | DIASTOLIC BLOOD PRESSURE: 80 MMHG

## 2023-03-29 PROCEDURE — 99203 OFFICE O/P NEW LOW 30 MIN: CPT

## 2023-03-29 NOTE — HISTORY OF PRESENT ILLNESS
[TextBox_4] : 32 year old man with Aflutter s/p DCCV, planned for ablation, HFREF, history of JANELLE diagnosed in 208, was previously non compliant with CPAP and now using it. Feels his sleep quality is adequate. Will order CPAP supplies. Will need initial sleep study report and most recent compliant data. Will follow in 1 month.

## 2023-04-05 ENCOUNTER — RESULT REVIEW (OUTPATIENT)
Age: 33
End: 2023-04-05

## 2023-04-05 ENCOUNTER — OUTPATIENT (OUTPATIENT)
Dept: OUTPATIENT SERVICES | Facility: HOSPITAL | Age: 33
LOS: 1 days | End: 2023-04-05
Payer: MEDICAID

## 2023-04-05 VITALS
DIASTOLIC BLOOD PRESSURE: 77 MMHG | SYSTOLIC BLOOD PRESSURE: 136 MMHG | HEIGHT: 69 IN | HEART RATE: 78 BPM | WEIGHT: 302.92 LBS | TEMPERATURE: 97 F | OXYGEN SATURATION: 100 % | RESPIRATION RATE: 18 BRPM

## 2023-04-05 DIAGNOSIS — Z90.49 ACQUIRED ABSENCE OF OTHER SPECIFIED PARTS OF DIGESTIVE TRACT: Chronic | ICD-10-CM

## 2023-04-05 DIAGNOSIS — I48.19 OTHER PERSISTENT ATRIAL FIBRILLATION: ICD-10-CM

## 2023-04-05 DIAGNOSIS — Z92.89 PERSONAL HISTORY OF OTHER MEDICAL TREATMENT: Chronic | ICD-10-CM

## 2023-04-05 DIAGNOSIS — Z98.890 OTHER SPECIFIED POSTPROCEDURAL STATES: Chronic | ICD-10-CM

## 2023-04-05 DIAGNOSIS — Z01.818 ENCOUNTER FOR OTHER PREPROCEDURAL EXAMINATION: ICD-10-CM

## 2023-04-05 LAB
ALBUMIN SERPL ELPH-MCNC: 4.5 G/DL — SIGNIFICANT CHANGE UP (ref 3.5–5.2)
ALP SERPL-CCNC: 117 U/L — HIGH (ref 30–115)
ALT FLD-CCNC: 30 U/L — SIGNIFICANT CHANGE UP (ref 0–41)
ANION GAP SERPL CALC-SCNC: 12 MMOL/L — SIGNIFICANT CHANGE UP (ref 7–14)
APTT BLD: 31 SEC — SIGNIFICANT CHANGE UP (ref 27–39.2)
AST SERPL-CCNC: 24 U/L — SIGNIFICANT CHANGE UP (ref 0–41)
BASOPHILS # BLD AUTO: 0.03 K/UL — SIGNIFICANT CHANGE UP (ref 0–0.2)
BASOPHILS NFR BLD AUTO: 0.5 % — SIGNIFICANT CHANGE UP (ref 0–1)
BILIRUB SERPL-MCNC: <0.2 MG/DL — SIGNIFICANT CHANGE UP (ref 0.2–1.2)
BUN SERPL-MCNC: 14 MG/DL — SIGNIFICANT CHANGE UP (ref 10–20)
CALCIUM SERPL-MCNC: 9 MG/DL — SIGNIFICANT CHANGE UP (ref 8.4–10.5)
CHLORIDE SERPL-SCNC: 101 MMOL/L — SIGNIFICANT CHANGE UP (ref 98–110)
CO2 SERPL-SCNC: 27 MMOL/L — SIGNIFICANT CHANGE UP (ref 17–32)
CREAT SERPL-MCNC: 1 MG/DL — SIGNIFICANT CHANGE UP (ref 0.7–1.5)
EGFR: 102 ML/MIN/1.73M2 — SIGNIFICANT CHANGE UP
EOSINOPHIL # BLD AUTO: 0.36 K/UL — SIGNIFICANT CHANGE UP (ref 0–0.7)
EOSINOPHIL NFR BLD AUTO: 5.7 % — SIGNIFICANT CHANGE UP (ref 0–8)
GLUCOSE SERPL-MCNC: 95 MG/DL — SIGNIFICANT CHANGE UP (ref 70–99)
HCT VFR BLD CALC: 40.5 % — LOW (ref 42–52)
HGB BLD-MCNC: 13.4 G/DL — LOW (ref 14–18)
IMM GRANULOCYTES NFR BLD AUTO: 0.2 % — SIGNIFICANT CHANGE UP (ref 0.1–0.3)
INR BLD: 1.21 RATIO — SIGNIFICANT CHANGE UP (ref 0.65–1.3)
LYMPHOCYTES # BLD AUTO: 1.79 K/UL — SIGNIFICANT CHANGE UP (ref 1.2–3.4)
LYMPHOCYTES # BLD AUTO: 28.4 % — SIGNIFICANT CHANGE UP (ref 20.5–51.1)
MCHC RBC-ENTMCNC: 28.2 PG — SIGNIFICANT CHANGE UP (ref 27–31)
MCHC RBC-ENTMCNC: 33.1 G/DL — SIGNIFICANT CHANGE UP (ref 32–37)
MCV RBC AUTO: 85.1 FL — SIGNIFICANT CHANGE UP (ref 80–94)
MONOCYTES # BLD AUTO: 0.49 K/UL — SIGNIFICANT CHANGE UP (ref 0.1–0.6)
MONOCYTES NFR BLD AUTO: 7.8 % — SIGNIFICANT CHANGE UP (ref 1.7–9.3)
NEUTROPHILS # BLD AUTO: 3.63 K/UL — SIGNIFICANT CHANGE UP (ref 1.4–6.5)
NEUTROPHILS NFR BLD AUTO: 57.4 % — SIGNIFICANT CHANGE UP (ref 42.2–75.2)
NRBC # BLD: 0 /100 WBCS — SIGNIFICANT CHANGE UP (ref 0–0)
PLATELET # BLD AUTO: 207 K/UL — SIGNIFICANT CHANGE UP (ref 130–400)
POTASSIUM SERPL-MCNC: 4 MMOL/L — SIGNIFICANT CHANGE UP (ref 3.5–5)
POTASSIUM SERPL-SCNC: 4 MMOL/L — SIGNIFICANT CHANGE UP (ref 3.5–5)
PROT SERPL-MCNC: 7 G/DL — SIGNIFICANT CHANGE UP (ref 6–8)
PROTHROM AB SERPL-ACNC: 13.9 SEC — HIGH (ref 9.95–12.87)
RBC # BLD: 4.76 M/UL — SIGNIFICANT CHANGE UP (ref 4.7–6.1)
RBC # FLD: 13.8 % — SIGNIFICANT CHANGE UP (ref 11.5–14.5)
SODIUM SERPL-SCNC: 140 MMOL/L — SIGNIFICANT CHANGE UP (ref 135–146)
WBC # BLD: 6.31 K/UL — SIGNIFICANT CHANGE UP (ref 4.8–10.8)
WBC # FLD AUTO: 6.31 K/UL — SIGNIFICANT CHANGE UP (ref 4.8–10.8)

## 2023-04-05 PROCEDURE — 80053 COMPREHEN METABOLIC PANEL: CPT

## 2023-04-05 PROCEDURE — 99214 OFFICE O/P EST MOD 30 MIN: CPT | Mod: 25

## 2023-04-05 PROCEDURE — 71046 X-RAY EXAM CHEST 2 VIEWS: CPT | Mod: 26

## 2023-04-05 PROCEDURE — 36415 COLL VENOUS BLD VENIPUNCTURE: CPT

## 2023-04-05 PROCEDURE — 85730 THROMBOPLASTIN TIME PARTIAL: CPT

## 2023-04-05 PROCEDURE — 85025 COMPLETE CBC W/AUTO DIFF WBC: CPT

## 2023-04-05 PROCEDURE — 85610 PROTHROMBIN TIME: CPT

## 2023-04-05 PROCEDURE — 71046 X-RAY EXAM CHEST 2 VIEWS: CPT

## 2023-04-05 NOTE — H&P PST ADULT - NSICDXPASTMEDICALHX_GEN_ALL_CORE_FT
[Home] : at home, [unfilled] , at the time of the visit. [Other Location: e.g. Home (Enter Location, City,State)___] : at [unfilled] [Family Member] : family member [Verbal consent obtained from patient] : the patient, [unfilled] [FreeTextEntry4] : almita Self [FreeTextEntry1] : 68 YOM with pmhx including CAD/MI s/p PCI x9, HTN, HLD, Hx Sinus Pauses, Tobacco Abuse+Hx prior PCI. Pt p/w CP that radiates to Left hand. Pt had a cat-> MVD-> OR 2/11 with Dr. Izquierdo for c3L. Post op course uneventful and pt discharged home to cousin's home with support of home care services. Initial [facility] visit completed via Telehealth. \par CC "My cousin is worried about this bump at the top of my incision" PAST MEDICAL HISTORY:  Afib     CHF (congestive heart failure)     Obese     JANELLE (obstructive sleep apnea)

## 2023-04-05 NOTE — H&P PST ADULT - HISTORY OF PRESENT ILLNESS
Case Type: OP Block TimeSuite: EP LabProceduralist: Adria Nino  Confirmed Surgery DateTime: 04- - 7:30PAST DateTime: 04- - 17:00Procedure: AF AFL ABLATION/KARAN/ RF  ERP?: UnavailableLaterality: N/ALength of Procedure: 180 Minutes  Anesthesia Type: Local Standby     32 yr old man to past for above procedure per pt h/o new dx aflutter rapid  chf and non ischemic cardiomyopathy in 1/23 to ed c/o sob and near syncope pt had lhc no sig cad and karan cardioversion echo showed dilated lv biatrial enlargement pt was treated with eliquis amodorone metoprolol and losartan with return to sr pt states prior history antwon dx 2016 non compliant with cpap prior to this hosp now using nightly and obese bmi 44 pt states since discharge no sob fatigue and has returned to work   Pt reports no cardiopulmonary issues denies sob/soares/cp/palpitations. Pt states no recent infections no fever no cough no uti uri. Stated exercise tolerance is   2  flights no changes. Antwon screen revd.  Pt denies any s/s covid 19 and reports no contact with known positive people. Pt has appointment for repeat covid testing pre op and instructed to continue to self monitor and report any concerns to MD. Pt will continue to practice self isolation and  exposure control measures pre op  non vaccinated has appt to test pre procedure  Anesthesia Alert  NO--Difficult Airway  NO--History of neck surgery or radiation  NO--Limited ROM of neck  NO--History of Malignant hyperthermia  NO--No personal or family history of Pseudocholinesterase deficiency.  NO--Prior Anesthesia Complication  NO--Latex Allergy  NO--Loose teeth  NO--History of Rheumatoid Arthritis  yes --ANTWON uses cpap   +NO--Other_____  no bleeding issue    Other persistent atrial fibrillation    Encounter for other preprocedural examination    FH: ALS (amyotrophic lateral sclerosis) (Father, Sibling)    No pertinent past medical history    CHF (congestive heart failure)    ANTWON (obstructive sleep apnea)    Afib    Obese    S/P appendectomy    History of cardioversion    S/P cardiac cath    SysAdmin_VisitLink    sunrise issues unable to edit med list this is the current list of meds rx and otc   eliquis  5 mg po q 12h  metoprolol 50 mg po q day am   losartan 25 mg po q day am  amiodarone 200 mg po q day am

## 2023-04-05 NOTE — H&P PST ADULT - REASON FOR ADMISSION
Case Type: OP Block TimeSuite: EP LabProceduralist: Adria Nino  Confirmed Surgery DateTime: 04- - 7:30PAST DateTime: 04- - 17:00Procedure: AF AFL ABLATION/GURINDER/ RF  ERP?: UnavailableLaterality: N/ALength of Procedure: 180 Minutes  Anesthesia Type: Local Standby

## 2023-04-05 NOTE — H&P PST ADULT - MUSCULOSKELETAL
"Name: Alberto Dangelo  [facility] Number: [id number]  Date of Treatment: 07/17/2018   Diagnosis:   Encounter Diagnoses   Name Primary?   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 Started work earlier than usual today and didn't have much time for stretching, so he's "all toned out" with increased R>L LE tones. Baltazar I in manual w/c with mod I stretching prior to session.     Objective:    Patient received individual therapy to increase strength, endurance, ROM, flexibility, posture and core stabilization with activities as follows:     Gait training via [employer] system for 66:14 minutes (plus set up):     Set up at 5 kg unloading. Computer-assisted robotic gait training via [email or web address] x 3.0 km/h. GF x 70% x 12', 50% x 22', then 30% for distance of 3211 m. Multiple stoppages.     Continue HEP daily.    Pt demo good understanding of the education provided. Patient demonstrated good return demonstration of activities.     Assessment:     Multiple stoppages 2* increased tones R>L LE's throughout session. Increased instances of R toe drag which improved toward end of gait training.     Pt will continue to benefit from skilled PT intervention. Medical Necessity is demonstrated by:  Requires skilled supervision to complete and progress HEP and Weakness.    Patient is making good progress towards established goals.    Plan:    Continue with established Plan of Care towards PT goals.     "
normal/ROM intact/normal gait/strength 5/5 bilateral upper extremities/strength 5/5 bilateral lower extremities

## 2023-04-06 DIAGNOSIS — I48.19 OTHER PERSISTENT ATRIAL FIBRILLATION: ICD-10-CM

## 2023-04-06 DIAGNOSIS — Z01.818 ENCOUNTER FOR OTHER PREPROCEDURAL EXAMINATION: ICD-10-CM

## 2023-04-10 ENCOUNTER — LABORATORY RESULT (OUTPATIENT)
Age: 33
End: 2023-04-10

## 2023-04-12 ENCOUNTER — APPOINTMENT (OUTPATIENT)
Dept: ELECTROPHYSIOLOGY | Facility: HOSPITAL | Age: 33
End: 2023-04-12

## 2023-04-12 ENCOUNTER — TRANSCRIPTION ENCOUNTER (OUTPATIENT)
Age: 33
End: 2023-04-12

## 2023-04-12 ENCOUNTER — INPATIENT (INPATIENT)
Facility: HOSPITAL | Age: 33
LOS: 0 days | Discharge: HOME HEALTH | DRG: 175 | End: 2023-04-13
Attending: INTERNAL MEDICINE | Admitting: INTERNAL MEDICINE
Payer: MEDICAID

## 2023-04-12 VITALS
DIASTOLIC BLOOD PRESSURE: 75 MMHG | SYSTOLIC BLOOD PRESSURE: 132 MMHG | TEMPERATURE: 96 F | OXYGEN SATURATION: 95 % | HEIGHT: 69 IN | WEIGHT: 302.25 LBS | RESPIRATION RATE: 18 BRPM | HEART RATE: 86 BPM

## 2023-04-12 DIAGNOSIS — Z98.890 OTHER SPECIFIED POSTPROCEDURAL STATES: Chronic | ICD-10-CM

## 2023-04-12 DIAGNOSIS — Z92.89 PERSONAL HISTORY OF OTHER MEDICAL TREATMENT: Chronic | ICD-10-CM

## 2023-04-12 DIAGNOSIS — I48.19 OTHER PERSISTENT ATRIAL FIBRILLATION: ICD-10-CM

## 2023-04-12 DIAGNOSIS — Z90.49 ACQUIRED ABSENCE OF OTHER SPECIFIED PARTS OF DIGESTIVE TRACT: Chronic | ICD-10-CM

## 2023-04-12 DIAGNOSIS — I48.91 UNSPECIFIED ATRIAL FIBRILLATION: ICD-10-CM

## 2023-04-12 PROCEDURE — C1893: CPT

## 2023-04-12 PROCEDURE — C1769: CPT

## 2023-04-12 PROCEDURE — C1759: CPT

## 2023-04-12 PROCEDURE — C1730: CPT

## 2023-04-12 PROCEDURE — 93320 DOPPLER ECHO COMPLETE: CPT | Mod: 26

## 2023-04-12 PROCEDURE — 93312 ECHO TRANSESOPHAGEAL: CPT | Mod: 26

## 2023-04-12 PROCEDURE — C1732: CPT

## 2023-04-12 PROCEDURE — 93655 ICAR CATH ABLTJ DSCRT ARRHYT: CPT

## 2023-04-12 PROCEDURE — 93005 ELECTROCARDIOGRAM TRACING: CPT

## 2023-04-12 PROCEDURE — 93657 TX L/R ATRIAL FIB ADDL: CPT

## 2023-04-12 PROCEDURE — 93325 DOPPLER ECHO COLOR FLOW MAPG: CPT | Mod: 26

## 2023-04-12 PROCEDURE — 93623 PRGRMD STIMJ&PACG IV RX NFS: CPT | Mod: 26

## 2023-04-12 PROCEDURE — 93306 TTE W/DOPPLER COMPLETE: CPT | Mod: 26

## 2023-04-12 PROCEDURE — 93623 PRGRMD STIMJ&PACG IV RX NFS: CPT

## 2023-04-12 PROCEDURE — C9399: CPT

## 2023-04-12 PROCEDURE — 93318 ECHO TRANSESOPHAGEAL INTRAOP: CPT

## 2023-04-12 PROCEDURE — C1766: CPT

## 2023-04-12 PROCEDURE — C1894: CPT

## 2023-04-12 PROCEDURE — 93656 COMPRE EP EVAL ABLTJ ATR FIB: CPT

## 2023-04-12 RX ORDER — APIXABAN 2.5 MG/1
5 TABLET, FILM COATED ORAL EVERY 12 HOURS
Refills: 0 | Status: DISCONTINUED | OUTPATIENT
Start: 2023-04-12 | End: 2023-04-13

## 2023-04-12 RX ORDER — FAMOTIDINE 10 MG/ML
20 INJECTION INTRAVENOUS ONCE
Refills: 0 | Status: COMPLETED | OUTPATIENT
Start: 2023-04-12 | End: 2023-04-12

## 2023-04-12 RX ORDER — METOPROLOL TARTRATE 50 MG
1 TABLET ORAL
Qty: 60 | Refills: 0
Start: 2023-04-12 | End: 2023-06-10

## 2023-04-12 RX ORDER — AMIODARONE HYDROCHLORIDE 400 MG/1
100 TABLET ORAL DAILY
Refills: 0 | Status: DISCONTINUED | OUTPATIENT
Start: 2023-04-12 | End: 2023-04-13

## 2023-04-12 RX ORDER — LOSARTAN POTASSIUM 100 MG/1
25 TABLET, FILM COATED ORAL DAILY
Refills: 0 | Status: DISCONTINUED | OUTPATIENT
Start: 2023-04-12 | End: 2023-04-13

## 2023-04-12 RX ORDER — AMIODARONE HYDROCHLORIDE 400 MG/1
200 TABLET ORAL DAILY
Refills: 0 | Status: DISCONTINUED | OUTPATIENT
Start: 2023-04-12 | End: 2023-04-12

## 2023-04-12 RX ORDER — LOSARTAN POTASSIUM 100 MG/1
1 TABLET, FILM COATED ORAL
Qty: 30 | Refills: 1
Start: 2023-04-12 | End: 2023-06-10

## 2023-04-12 RX ORDER — LANOLIN ALCOHOL/MO/W.PET/CERES
3 CREAM (GRAM) TOPICAL AT BEDTIME
Refills: 0 | Status: DISCONTINUED | OUTPATIENT
Start: 2023-04-12 | End: 2023-04-13

## 2023-04-12 RX ORDER — ACETAMINOPHEN 500 MG
1000 TABLET ORAL ONCE
Refills: 0 | Status: COMPLETED | OUTPATIENT
Start: 2023-04-12 | End: 2023-04-12

## 2023-04-12 RX ORDER — METOPROLOL TARTRATE 50 MG
50 TABLET ORAL DAILY
Refills: 0 | Status: DISCONTINUED | OUTPATIENT
Start: 2023-04-12 | End: 2023-04-13

## 2023-04-12 RX ORDER — FAMOTIDINE 10 MG/ML
1 INJECTION INTRAVENOUS
Qty: 30 | Refills: 0
Start: 2023-04-12 | End: 2023-05-11

## 2023-04-12 RX ORDER — FAMOTIDINE 10 MG/ML
40 INJECTION INTRAVENOUS DAILY
Refills: 0 | Status: DISCONTINUED | OUTPATIENT
Start: 2023-04-13 | End: 2023-04-13

## 2023-04-12 RX ORDER — APIXABAN 2.5 MG/1
1 TABLET, FILM COATED ORAL
Qty: 120 | Refills: 0
Start: 2023-04-12 | End: 2023-06-10

## 2023-04-12 RX ORDER — AMIODARONE HYDROCHLORIDE 400 MG/1
1 TABLET ORAL
Qty: 0 | Refills: 0 | DISCHARGE

## 2023-04-12 RX ORDER — ACETAMINOPHEN 500 MG
650 TABLET ORAL EVERY 6 HOURS
Refills: 0 | Status: DISCONTINUED | OUTPATIENT
Start: 2023-04-12 | End: 2023-04-13

## 2023-04-12 RX ORDER — HYDROMORPHONE HYDROCHLORIDE 2 MG/ML
1 INJECTION INTRAMUSCULAR; INTRAVENOUS; SUBCUTANEOUS ONCE
Refills: 0 | Status: DISCONTINUED | OUTPATIENT
Start: 2023-04-12 | End: 2023-04-12

## 2023-04-12 RX ORDER — AMIODARONE HYDROCHLORIDE 400 MG/1
0.5 TABLET ORAL
Qty: 15 | Refills: 0
Start: 2023-04-12 | End: 2023-05-11

## 2023-04-12 RX ADMIN — Medication 1000 MILLIGRAM(S): at 18:45

## 2023-04-12 RX ADMIN — APIXABAN 5 MILLIGRAM(S): 2.5 TABLET, FILM COATED ORAL at 18:01

## 2023-04-12 RX ADMIN — HYDROMORPHONE HYDROCHLORIDE 1 MILLIGRAM(S): 2 INJECTION INTRAMUSCULAR; INTRAVENOUS; SUBCUTANEOUS at 17:51

## 2023-04-12 RX ADMIN — FAMOTIDINE 20 MILLIGRAM(S): 10 INJECTION INTRAVENOUS at 19:04

## 2023-04-12 RX ADMIN — Medication 400 MILLIGRAM(S): at 18:15

## 2023-04-12 RX ADMIN — HYDROMORPHONE HYDROCHLORIDE 1 MILLIGRAM(S): 2 INJECTION INTRAMUSCULAR; INTRAVENOUS; SUBCUTANEOUS at 18:15

## 2023-04-12 RX ADMIN — Medication 650 MILLIGRAM(S): at 21:27

## 2023-04-12 RX ADMIN — Medication 650 MILLIGRAM(S): at 20:57

## 2023-04-12 NOTE — CHART NOTE - NSCHARTNOTEFT_GEN_A_CORE
Electrophysiology Brief Post-Op Note      I have personally seen and examined the patient.  I agree with the history and physical which I have reviewed and noted any changes below.    PRE-OP DIAGNOSIS:  -Persistent Atrial Fibrillation and Atrial Flutter    POST-OP DIAGNOSIS:  -Persistent Atrial Fibrillation  and Atrial Flutter      PROCEDURE:  -RF ablation of Atrial Fibrillation   -3D mapping   -Infusion of Medicine  -Transeptal puncture  -Use of intracardiac Echo  -GURINDER      Vascular Access used (using Ultrasound Guidance)  -Right Femoral Vein: 8F    -Left Femoral Vein: 11F 8F  -Right Femoral Artery: none    All sheaths and wires removed and manual pressure applied.    Physician: Mica  Assistant: None    ANESTHESIA TYPE:  [X]General Anesthesia  [  ] Sedation  [X] Local/Regional      CONDITION  [  ] Critical  [  ] Serious  [  ]Fair  [X]Good      SPECIMENS REMOVED (IF APPLICABLE): NONE      IMPLANTS (IF APPLICABLE): NONE      FINDINGS (see below)  -Successful Pulmonary Veins Isolation   -Successful CTI isthmus ablation  -No pericardial effusion on ICE  -No immediate complications  -ESTIMATED BLOOD LOSS:  15 mL  -Contrast Used: none        PLAN OF CARE  -	Start Eliquis 5 mg q12 tonight   -	Start Pepcid 20 mg daily tonight  -	Bed rest for 4 hours  -	Admit to telemetry

## 2023-04-12 NOTE — DISCHARGE NOTE PROVIDER - PROVIDER TOKENS
PROVIDER:[TOKEN:[[id number]],SCHEDULEDAPPT:[05/22/2023],SCHEDULEDAPPTTIME:[10:00 AM],ESTABLISHEDPATIENT:[T]]

## 2023-04-12 NOTE — DISCHARGE NOTE PROVIDER - NSDCQMPCI_CARD_ALL_CORE
Pt admitted at 38 weeks for  section for twins. Pt had uncomplicated  delivery and uncomplicated postoperative course. On postpartum day 2 the patient met criteria for discharge home.
No

## 2023-04-12 NOTE — DISCHARGE NOTE PROVIDER - HOSPITAL COURSE
Patient is a 31 yo male with pmhx JANELLE ( uses cpap), persistent afib/aflutter ( s/p DCCV 2/23), NICM ( on 2/23 EF 36%) 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 LHC on 1/23 shows minor luminal irregularities. He is now presented to [facility] for elective Afib/AFL ablation.  On 4/12/23, patient underwent successful RF of Pulmonary Veins Isolation and successful CTI isthmus ablation by Dr. Britton. Patient was monitored overnight.   On POD 1 patient remains HD stable with no complaints. Patient remains in SR with no arrhythmias noted on tele. Examination of B/L common femoral venous access sites reveal a Clear and dry area with no hematoma or erythema. Patient is to continue taking Eliquis for thromboembolic prophylaxis. Amiodarone dosage was decreased to 100mg po daily with plan to stop in 6 weeks.  Patient is being DC home in stable condition and will followup with Dr. Britton outpatient as scheduled. Patient is a 33 yo male with pmhx JANELLE (CPAP at home), persistent afib/aflutter (s/p DCCV 2/23), NICM (on 2/23 EF 36%) 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 LHC on 1/23 shows minor luminal irregularities. He is now presented to [facility] for elective Afib/AFL ablation.  On 4/12/23, patient underwent successful RF of Pulmonary Veins Isolation and successful CTI isthmus ablation by Dr. Britton. Patient was monitored overnight.   On POD 1 patient remains HD stable with c/o R elbow numbness and tenderness as well as numbness in his right palm likely d/t positioning and compression during the procedure.  He was seen by neuro who ** Patient remains in SR with no arrhythmias noted on tele. Examination of B/L common femoral venous access sites reveal a Clear and dry area with no hematoma or erythema. Patient is to continue taking Eliquis for thromboembolic prophylaxis. Amiodarone dosage was decreased to 100mg po daily with plan to stop in 6 weeks.  Patient is being DC home in stable condition and will follow up with Dr. Britton outpatient as scheduled. Patient is a 31 yo male with pmhx JANELLE (CPAP at home), persistent afib/aflutter (s/p DCCV 2/23), NICM (on 2/23 EF 36%) 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 LHC on 1/23 shows minor luminal irregularities. He is now presented to [facility] for elective Afib/AFL ablation.  On 4/12/23, patient underwent successful RF of Pulmonary Veins Isolation and successful CTI isthmus ablation by Dr. Britton. Patient was monitored overnight.   On POD 1 patient remains HD stable with c/o R elbow numbness and tenderness as well as numbness in his right palm likely d/t positioning and compression during the procedure.  He was seen by neuro who agreed with the former assessment.  Pt instructed symptoms should gradually resolve over the next 24 hrs and may follow-up with neuro as outpatient if symptoms should continue or worsen.  Patient remains in SR with no arrhythmias noted on tele. Examination of B/L common femoral venous access sites reveal a Clear and dry area with no hematoma or erythema. Patient is to continue taking Eliquis for thromboembolic prophylaxis. Amiodarone dosage was decreased to 100mg po daily with plan to stop in 6 weeks.  Patient is being DC home in stable condition and will follow up with Dr. Britton outpatient as scheduled. Patient is a 33 yo male with pmhx JANELLE (CPAP at home), persistent afib/aflutter (s/p DCCV 2/23), NICM (on 2/23 EF 36%) 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 LHC on 1/23 shows minor luminal irregularities. He is now presented to [facility] for elective Afib/AFL ablation.  On 4/12/23, patient underwent successful RF of Pulmonary Veins Isolation and successful CTI isthmus ablation by Dr. Britton. Patient was monitored overnight.   On POD 1 patient remains HD stable with c/o R elbow numbness and tenderness as well as numbness in his right palm likely d/t positioning and compression during the procedure.  He was seen by neuro who agreed with the former assessment.  Pt instructed symptoms should gradually resolve over the next 24 hrs and may follow-up with neuro as outpatient if symptoms should continue.  Patient remains in SR with no arrhythmias noted on tele. Examination of B/L common femoral venous access sites reveal a Clear and dry area with no hematoma or erythema. Patient is to continue taking Eliquis for thromboembolic prophylaxis. Amiodarone dosage was decreased to 100mg po daily with plan to stop in 6 weeks.  Patient is being DC home in stable condition and will follow up with Dr. Britton outpatient as scheduled.

## 2023-04-12 NOTE — PATIENT PROFILE ADULT - FALL HARM RISK - HARM RISK INTERVENTIONS

## 2023-04-12 NOTE — DISCHARGE NOTE PROVIDER - CARE PROVIDER_API CALL
Adria Nino)  Cardiac Electrophysiology; Cardiovascular Disease; [facility]  [address] [place] [address]  Phone: [phone]  Fax: [phone]  Established Patient  Scheduled Appointment: 05/22/2023 10:00 AM

## 2023-04-12 NOTE — DISCHARGE NOTE PROVIDER - NSDCCPCAREPLAN_GEN_ALL_CORE_FT
PRINCIPAL DISCHARGE DIAGNOSIS  Diagnosis: Other persistent atrial fibrillation  Assessment and Plan of Treatment:

## 2023-04-12 NOTE — DISCHARGE NOTE PROVIDER - NSDCMRMEDTOKEN_GEN_ALL_CORE_FT
amiodarone 200 mg oral tablet: 0.5 tab(s) orally once a day  apixaban 5 mg oral tablet: 1 tab(s) orally 2 times a day  famotidine 40 mg oral tablet: 1 tab(s) orally once a day  losartan 25 mg oral tablet: 1 tab(s) orally once a day  metoprolol succinate 50 mg oral capsule, extended release: 1 cap(s) orally once a day

## 2023-04-12 NOTE — DISCHARGE NOTE PROVIDER - NSDCFUSCHEDAPPT_GEN_ALL_CORE_FT
Shiela Roland  [facility] Physician [facility]  PULMMED [address]  Scheduled Appointment: 04/25/2023    Adria Nino  [facility] Physician [facility] [address]  Scheduled Appointment: 05/22/2023    Joseph Montalvo  [facility] Physician [facility]  CARDIOLOGY [address]  Scheduled Appointment: 06/26/2023

## 2023-04-12 NOTE — DISCHARGE NOTE PROVIDER - NSDCCPTREATMENT_GEN_ALL_CORE_FT
PRINCIPAL PROCEDURE  Procedure: Cardiac ablation  Findings and Treatment:   - Please start taking famotidine 40 mg daily for 30 days.   - You should continue your Eliquis 5mg  - amiodarone was reduced to 100mg once daily   - Do not drive or operate heavy machinery for 3 days.  - Do not submerge in water (example: baths, swimming) for 1 week.  - No squatting, exertional activities, or lifting anything > 10 lbs for 1 week.  - Any sudden swelling, redness, fever, discharge, or severe pain, call the Electrophysiology Office at [phone].     PRINCIPAL PROCEDURE  Procedure: Cardiac ablation  Findings and Treatment: - Please start taking famotidine 40 mg daily for 30 days.   - You should continue your Eliquis 5mg twice daily.  - amiodarone was reduced to 100mg once daily   - Do not drive or operate heavy machinery for 3 days.  - Do not submerge in water (example: baths, swimming) for 1 week.  - No squatting, exertional activities, or lifting anything > 10 lbs for 1 week.  - Any sudden swelling, redness, fever, discharge, or severe pain, call the Electrophysiology Office at [phone].

## 2023-04-13 ENCOUNTER — TRANSCRIPTION ENCOUNTER (OUTPATIENT)
Age: 33
End: 2023-04-13

## 2023-04-13 VITALS
SYSTOLIC BLOOD PRESSURE: 133 MMHG | DIASTOLIC BLOOD PRESSURE: 77 MMHG | OXYGEN SATURATION: 97 % | RESPIRATION RATE: 18 BRPM | TEMPERATURE: 97 F | HEART RATE: 89 BPM

## 2023-04-13 PROCEDURE — 93010 ELECTROCARDIOGRAM REPORT: CPT

## 2023-04-13 PROCEDURE — 99232 SBSQ HOSP IP/OBS MODERATE 35: CPT

## 2023-04-13 PROCEDURE — 99238 HOSP IP/OBS DSCHRG MGMT 30/<: CPT

## 2023-04-13 RX ORDER — AMIODARONE HYDROCHLORIDE 200 MG/1
200 TABLET ORAL
Qty: 90 | Refills: 1 | Status: DISCONTINUED | COMMUNITY
Start: 2023-02-13 | End: 2023-04-13

## 2023-04-13 RX ADMIN — Medication 50 MILLIGRAM(S): at 05:26

## 2023-04-13 RX ADMIN — FAMOTIDINE 40 MILLIGRAM(S): 10 INJECTION INTRAVENOUS at 12:28

## 2023-04-13 RX ADMIN — APIXABAN 5 MILLIGRAM(S): 2.5 TABLET, FILM COATED ORAL at 05:25

## 2023-04-13 RX ADMIN — AMIODARONE HYDROCHLORIDE 100 MILLIGRAM(S): 400 TABLET ORAL at 05:26

## 2023-04-13 RX ADMIN — LOSARTAN POTASSIUM 25 MILLIGRAM(S): 100 TABLET, FILM COATED ORAL at 05:26

## 2023-04-13 NOTE — PROGRESS NOTE ADULT - SUBJECTIVE AND OBJECTIVE BOX
INTERVAL HPI/OVERNIGHT EVENTS:  Pt is POD #1 AF/AFL ablation. No acute events over night. In NSR, no tele events noted.   Patient denies fever, chills, dizziness, syncope, chest pain, palpitations, SOB, cough, abd pain, n/v/d/c, dysuria, hematuria or unusual rash.     MEDICATIONS  (STANDING):  aMIOdarone    Tablet 100 milliGRAM(s) Oral daily  apixaban 5 milliGRAM(s) Oral every 12 hours  famotidine    Tablet 40 milliGRAM(s) Oral daily  losartan 25 milliGRAM(s) Oral daily  metoprolol succinate ER 50 milliGRAM(s) Oral daily    MEDICATIONS  (PRN):  acetaminophen     Tablet .. 650 milliGRAM(s) Oral every 6 hours PRN Mild Pain (1 - 3), Moderate Pain (4 - 6)  melatonin 3 milliGRAM(s) Oral at bedtime PRN Insomnia      Allergies  No Known Allergies    REVIEW OF SYSTEMS    [x] A ten-point review of systems was otherwise negative except as noted.  [ ] Due to altered mental status/intubation, subjective information were not able to be obtained from the patient. History was obtained, to the extent possible, from review of the chart and collateral sources of information.      Vital Signs Last 24 Hrs  T(C): 36.1 (13 Apr 2023 12:08), Max: 36.2 (13 Apr 2023 07:06)  T(F): 97 (13 Apr 2023 12:08), Max: 97.1 (13 Apr 2023 07:06)  HR: 89 (13 Apr 2023 12:08) (82 - 89)  BP: 133/77 (13 Apr 2023 12:08) (122/63 - 139/70)  BP(mean): 99 (13 Apr 2023 12:08) (85 - 99)  RR: 18 (13 Apr 2023 12:08) (17 - 20)  SpO2: 97% (13 Apr 2023 12:08) (93% - 97%)    Parameters below as of 13 Apr 2023 07:06  Patient On (Oxygen Delivery Method): room air        04-12-23 @ 07:01  -  04-13-23 @ 07:00  --------------------------------------------------------  IN: 160 mL / OUT: 2150 mL / NET: -1990 mL    04-13-23 @ 07:01 - 04-13-23 @ 13:37  --------------------------------------------------------  IN: 220 mL / OUT: 0 mL / NET: 220 mL        Physical Exam  GENERAL: In no apparent distress, well nourished, and hydrated.  HEART: Regular rate and rhythm; No murmurs, rubs, or gallops.  PULMONARY: Clear to auscultation and perfusion.  No rales, wheezing, or rhonchi bilaterally.  ABDOMEN: Soft, Nontender, Nondistended; Bowel sounds present  EXTREMITIES: B/L groins soft, no hematoma. drainage noted.  2+ Peripheral Pulses, No clubbing, cyanosis, or edema  NEUROLOGICAL: AO x4 ,BREAUX, speech clear    LABS:      04-12-23 @ 07:01  -  04-13-23 @ 07:00  --------------------------------------------------------  IN: 160 mL / OUT: 2150 mL / NET: -1990 mL    04-13-23 @ 07:01 - 04-13-23 @ 13:37  --------------------------------------------------------  IN: 220 mL / OUT: 0 mL / NET: 220 mL      04-12-23 @ 07:01  -  04-13-23 @ 07:00  --------------------------------------------------------  IN: 160 mL / OUT: 2150 mL / NET: -1990 mL    04-13-23 @ 07:01  -  04-13-23 @ 13:37  --------------------------------------------------------  IN: 220 mL / OUT: 0 mL / NET: 220 mL        RADIOLOGY:  < from: TTE Echo Complete w/o Contrast w/ Doppler (02.04.23 @ 10:19) >  Summary:   1. Suboptimal study.   2. LV Ejection Fraction by Rodriguez's Method with a biplane EF of 36 %.   3. Moderately decreased global left ventricular systolic function.   4. The left ventricular diastolic function could not be assessed in this   study.   5. Biatrial enlargement.   6. Mild tricuspid regurgitation.   7. Estimated pulmonary artery systolic pressure is 36.9 mmHg assuming a   right atrial pressure of 8 mmHg, which is consistent with borderline   pulmonary hypertension.    PHYSICIAN INTERPRETATION:  Left Ventricle: The left ventricular internal cavity size is mildly   increased. Left ventricular wall thickness is normal. Global LV systolic   function was moderately decreased. The left ventricular diastolic   function could not be assessed in this study.  Right Ventricle: RV systolic function is grossly normal.  Left Atrium: Left atrial enlargement.  Right Atrium: Right atrial enlargement. RA Area is 21.86 cm² cm2.  Pericardium: There is no evidence of pericardial effusion.  Mitral Valve: The mitral valve is normal in structure. No evidence of   mitral stenosis. Trivial mitral regurgitation.  Tricuspid Valve: The tricuspid valve is grossly normal. Mild tricuspid   regurgitation is visualized. Estimated pulmonary artery systolic pressure   is 36.9 mmHg assuming a right atrial pressure of 8 mmHg, which is   consistent with borderline pulmonary hypertension.  Aortic Valve: Normal trileaflet aortic valve withnormal opening. No   evidence of aortic stenosis. No aortic regurgitation.  Pulmonic Valve: No pulmonic regurgitation.  Aorta: The aortic root and ascending aorta are normal in size.  Venous: The inferior vena cava was normal sized, with respiratory size   variation less than 50%.    < end of copied text >

## 2023-04-13 NOTE — DISCHARGE NOTE NURSING/CASE MANAGEMENT/SOCIAL WORK - PATIENT PORTAL LINK FT
You can access the FollowMyHealth Patient Portal offered by [facility] by registering at the following website: [email or web address]. By joining [facility]’s FollowMyHealth portal, you will also be able to view your health information using other applications (apps) compatible with our system.

## 2023-04-13 NOTE — PROGRESS NOTE ADULT - ASSESSMENT
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 LHC on 1/23 shows minor luminal irregularities. He is now presented to [facility] for elective Afib/AFL ablation.  On 4/12/23, patient underwent successful RF of Pulmonary Veins Isolation and successful CTI isthmus ablation. Postop c/o R elbow numbness and tenderness as well as numbness in his right palm likely d/t positioning and compression during the procedure.  He was seen by neuro who agreed with the former assessment.      Plan:  - EKG noted: SR 82, incomplete RBBB ([id number])  - Cont Eliquis uninterupted for 3 months postop  - Cont metoprolol XL 50 mg daily  - Decrease amio to 100 mg daily - will discontinue in office  - Pepcid for 30 days postop  - Ambulate as tolerated  - Fu in office on 5/22 10AM    Discharge Instructions:  - Continue Eliquis  - Do NOT stop blood thinners unless discussed with doctor  - Cont Pepcid daily x 30 days  - No heavy lifting > 10 lbs, squatting, or exertional activities 2 weeks  - Can take a shower starting today  - No submerging in water for 1 week  - No driving for 3 days  - Fu in office on 5/22 10AM     EP: Mica    Patient is a 33 yo male with pmhx JANELLE (CPAP at home), persistent afib/aflutter (s/p DCCV 2/23), NICM (on 2/23 EF 36%) 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 LHC on 1/23 shows minor luminal irregularities. He is now presented to [facility] for elective Afib/AFL ablation.  On 4/12/23, patient underwent successful RF of Pulmonary Veins Isolation and successful CTI isthmus ablation. Postop c/o R elbow numbness and tenderness as well as numbness in his right palm likely d/t positioning and compression during the procedure.  He was seen by neuro who agreed with the former assessment.      Plan:  - EKG noted: SR 82, incomplete RBBB ([id number])  - Cont Eliquis uninterrupted for 3 months postop  - Cont metoprolol XL 50 mg daily  - Decrease amio to 100 mg daily - will discontinue in office  - Pepcid for 30 days postop  - Ambulate as tolerated  - Fu in office on 5/22 10AM    Discharge Instructions:  - Continue Eliquis  - Do NOT stop blood thinners unless discussed with doctor  - Cont Pepcid daily x 30 days  - No heavy lifting > 10 lbs, squatting, or exertional activities 2 weeks  - Can take a shower starting today  - No submerging in water for 1 week  - No driving for 3 days  - Fu in office on 5/22 10AM

## 2023-04-19 DIAGNOSIS — I48.19 OTHER PERSISTENT ATRIAL FIBRILLATION: ICD-10-CM

## 2023-04-19 DIAGNOSIS — Z99.89 DEPENDENCE ON OTHER ENABLING MACHINES AND DEVICES: ICD-10-CM

## 2023-04-19 DIAGNOSIS — Z90.49 ACQUIRED ABSENCE OF OTHER SPECIFIED PARTS OF DIGESTIVE TRACT: ICD-10-CM

## 2023-04-19 DIAGNOSIS — E66.9 OBESITY, UNSPECIFIED: ICD-10-CM

## 2023-04-19 DIAGNOSIS — Z79.01 LONG TERM (CURRENT) USE OF ANTICOAGULANTS: ICD-10-CM

## 2023-04-19 DIAGNOSIS — I45.10 UNSPECIFIED RIGHT BUNDLE-BRANCH BLOCK: ICD-10-CM

## 2023-04-19 DIAGNOSIS — G47.33 OBSTRUCTIVE SLEEP APNEA (ADULT) (PEDIATRIC): ICD-10-CM

## 2023-04-19 DIAGNOSIS — I50.9 HEART FAILURE, UNSPECIFIED: ICD-10-CM

## 2023-04-19 DIAGNOSIS — I42.8 OTHER CARDIOMYOPATHIES: ICD-10-CM

## 2023-04-26 ENCOUNTER — NON-APPOINTMENT (OUTPATIENT)
Age: 33
End: 2023-04-26

## 2023-05-15 ENCOUNTER — APPOINTMENT (OUTPATIENT)
Dept: PULMONOLOGY | Facility: CLINIC | Age: 33
End: 2023-05-15
Payer: MEDICAID

## 2023-05-15 VITALS
WEIGHT: 300 LBS | HEIGHT: 69 IN | BODY MASS INDEX: 44.43 KG/M2 | OXYGEN SATURATION: 98 % | HEART RATE: 78 BPM | SYSTOLIC BLOOD PRESSURE: 130 MMHG | DIASTOLIC BLOOD PRESSURE: 78 MMHG

## 2023-05-15 PROCEDURE — 99213 OFFICE O/P EST LOW 20 MIN: CPT

## 2023-05-15 NOTE — HISTORY OF PRESENT ILLNESS
[TextBox_4] : 32 year old man with Aflutter s/p DCCV, planned for ablation, HFREF, history of JANELLE diagnosed in 208, was previously non compliant with CPAP and now using it. Feels his sleep quality is adequate. Will order CPAP supplies. Will need initial sleep study report and most recent compliant data. Will follow in 1 month. \par \par 5/15/23: He brought his CPAP in; compliance data: unable to obtain due to age of the machine. Will order home sleep study and get him a new CPAP.

## 2023-05-16 PROBLEM — E66.9 OBESITY, UNSPECIFIED: Chronic | Status: ACTIVE | Noted: 2023-04-05

## 2023-05-19 ENCOUNTER — OUTPATIENT (OUTPATIENT)
Dept: OUTPATIENT SERVICES | Facility: HOSPITAL | Age: 33
LOS: 1 days | Discharge: HOME | End: 2023-05-19
Payer: MEDICAID

## 2023-05-19 ENCOUNTER — APPOINTMENT (OUTPATIENT)
Dept: SLEEP CENTER | Facility: HOSPITAL | Age: 33
End: 2023-05-19
Payer: MEDICAID

## 2023-05-19 DIAGNOSIS — Z98.890 OTHER SPECIFIED POSTPROCEDURAL STATES: Chronic | ICD-10-CM

## 2023-05-19 DIAGNOSIS — Z90.49 ACQUIRED ABSENCE OF OTHER SPECIFIED PARTS OF DIGESTIVE TRACT: Chronic | ICD-10-CM

## 2023-05-19 DIAGNOSIS — Z92.89 PERSONAL HISTORY OF OTHER MEDICAL TREATMENT: Chronic | ICD-10-CM

## 2023-05-19 DIAGNOSIS — G47.33 OBSTRUCTIVE SLEEP APNEA (ADULT) (PEDIATRIC): ICD-10-CM

## 2023-05-19 PROCEDURE — 95800 SLP STDY UNATTENDED: CPT

## 2023-05-19 PROCEDURE — 95800 SLP STDY UNATTENDED: CPT | Mod: 26

## 2023-05-22 ENCOUNTER — APPOINTMENT (OUTPATIENT)
Dept: ELECTROPHYSIOLOGY | Facility: CLINIC | Age: 33
End: 2023-05-22
Payer: MEDICAID

## 2023-05-22 VITALS
HEIGHT: 69 IN | BODY MASS INDEX: 44.43 KG/M2 | DIASTOLIC BLOOD PRESSURE: 70 MMHG | TEMPERATURE: 98 F | SYSTOLIC BLOOD PRESSURE: 123 MMHG | HEART RATE: 64 BPM | WEIGHT: 300 LBS

## 2023-05-22 PROCEDURE — 93000 ELECTROCARDIOGRAM COMPLETE: CPT

## 2023-05-22 PROCEDURE — 99214 OFFICE O/P EST MOD 30 MIN: CPT | Mod: 25

## 2023-05-22 RX ORDER — FAMOTIDINE 40 MG/1
40 TABLET, FILM COATED ORAL DAILY
Refills: 0 | Status: COMPLETED | COMMUNITY
Start: 2023-04-13 | End: 2023-05-22

## 2023-05-22 RX ORDER — LOSARTAN POTASSIUM 25 MG/1
25 TABLET, FILM COATED ORAL DAILY
Refills: 0 | Status: COMPLETED | COMMUNITY
End: 2023-05-22

## 2023-05-22 RX ORDER — AMIODARONE HYDROCHLORIDE 100 MG/1
100 TABLET ORAL DAILY
Refills: 0 | Status: COMPLETED | COMMUNITY
Start: 2023-04-13 | End: 2023-05-14

## 2023-05-22 NOTE — CARDIOLOGY SUMMARY
[de-identified] : (5/22/2023): ECG sinus rhythm at 64 BPM, LVH?, non-specific T-wave abnormalities.  \par (3/14/2023): ECG sinus rhythm at 70 BPM, left atrial enlargement, non-specific T-wave abnormalities.   [de-identified] : (2/4/2023): 2D Echo moderately decreased LV systolic function. EF 36%. Bi-atrial enlargement. Mild TR.  [de-identified] : (4/12/2023) RF Ablation of Atrial Fibrillation: PVI + CTI ablation [de-identified] : (2/3/2023): Coronary angiography demonstrate minor luminal irregularities.

## 2023-05-22 NOTE — DISCUSSION/SUMMARY
[FreeTextEntry1] : Mr. Thony Conte is a pleasant 32-year-old man with Persistent Atrial Flutter/Atrial Fibrillation, New Onset Cardiomyopathy, Obstructive Sleep Apnea on CPAP, presenting to discuss management of Atrial Fibrillation and Atrial Flutter. Patient has most likely Tachycardia induced Cardiomyopathy leading to Tachycardia induced Cardiomyopathy. Patient converted to sinus rhythm after 2 Cardioversion. He is on Amiodarone to maintain sinus rhythm. He underwent RF ablation of Atrial Fibrillation (PVI + CTI) on 4/12/2023. He remains in sinus rhythm. He stopped Amiodarone 100 mg daily on 5/14/2023. \par \par I recommend to continue same medication. I discuss with patient at length importance of compliance with Eliquis. I recommend to continue Eliquis as patient CHADSVASC Score is 1.\par \par Patient has no arrhythmias since the catheter ablation. There are no groin hematomas or bleeding. Patient is pleased with results of catheter ablation although is aware with the risk of recurrent symptoms and need for another ablation. Post ablation care was discussed in great length. I discussed with patient contacting me in case of any symptoms suggestive of recurrence.\par \par I recommend to continue same medications.\par \par I discussed with patient plan of care in great details. I answered all his questions to his satisfaction. Patient was pleased with the visit.\par \par Patient will follow with me in 5 months’ time. Please do not hesitate to contact me at [phone] if you have any further questions regarding this patient care.\par \par  [EKG obtained to assist in diagnosis and management of assessed problem(s)] : EKG obtained to assist in diagnosis and management of assessed problem(s)

## 2023-05-22 NOTE — HISTORY OF PRESENT ILLNESS
[FreeTextEntry1] : Patient presented to [facility] ER on January 31st, 2023 for Pre-syncope. He was diagnosed with New Onset Non-Ischemic Cardiomyopathy with EF of 36%, no significant SOPHIE on Cath, and New Onset Persistent Atrial Fibrillation and Atrial Flutter with RVR. Patient underwent successful GURINDER and Cardioversion on 2/4/2023 as was discharge home. On follow-up, patient was found back in Atrial Flutter with RVR. He underwent Cardioversion on 2/15/2023 with initiation of Amiodarone.\par \par 4/12/2023: RF ablation of AFIB: PVI + CTI\par \par 5/22/2023: remains in sinus. stopped Amiodarone 100 mg daily on 5/14/2023.\par \par Patient has no chest pain, no shortness of breath at rest, no dyspnea on exertion, no lightheadedness, no dizziness, and no syncope. He present for management of Atrial Fibrillation and Atrial Flutter.

## 2023-05-23 DIAGNOSIS — G47.33 OBSTRUCTIVE SLEEP APNEA (ADULT) (PEDIATRIC): ICD-10-CM

## 2023-06-26 ENCOUNTER — APPOINTMENT (OUTPATIENT)
Dept: CARDIOLOGY | Facility: CLINIC | Age: 33
End: 2023-06-26
Payer: MEDICAID

## 2023-06-26 ENCOUNTER — APPOINTMENT (OUTPATIENT)
Dept: INTERNAL MEDICINE | Facility: CLINIC | Age: 33
End: 2023-06-26

## 2023-06-26 VITALS
SYSTOLIC BLOOD PRESSURE: 120 MMHG | BODY MASS INDEX: 45.18 KG/M2 | HEART RATE: 77 BPM | HEIGHT: 69 IN | WEIGHT: 305 LBS | DIASTOLIC BLOOD PRESSURE: 80 MMHG

## 2023-06-26 DIAGNOSIS — I50.20 UNSPECIFIED SYSTOLIC (CONGESTIVE) HEART FAILURE: ICD-10-CM

## 2023-06-26 PROCEDURE — 93000 ELECTROCARDIOGRAM COMPLETE: CPT

## 2023-06-26 PROCEDURE — 99214 OFFICE O/P EST MOD 30 MIN: CPT | Mod: 25

## 2023-06-26 NOTE — DISCUSSION/SUMMARY
[FreeTextEntry1] : Cont Toprol\par Cont Eliquis\par ECHO 3-months post ablation / evaluate for LV recovery.\par Follow-up 3-months

## 2023-06-26 NOTE — ASSESSMENT
[FreeTextEntry1] : NICM (severe LV dysfunction)\par Possibly tachycardia mediated\par \par AFL / AF s/p Ablation\par \par Chronic Systolic CHF\par Compensated

## 2023-06-26 NOTE — PHYSICAL EXAM
[de-identified] : obese, no distress [de-identified] : reg, nL s1/s2, no m/r/g [de-identified] : CTA [de-identified] : warm, no edema [de-identified] : alert, normal affect, logical conversation

## 2023-06-26 NOTE — REASON FOR VISIT
[FreeTextEntry1] : Underwent ablation without complication.\par \par Stopped Amiodarone and Losartan.\par \par Feels much better.  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

## 2023-07-31 ENCOUNTER — APPOINTMENT (OUTPATIENT)
Dept: PULMONOLOGY | Facility: CLINIC | Age: 33
End: 2023-07-31
Payer: MEDICAID

## 2023-07-31 VITALS
WEIGHT: 302 LBS | BODY MASS INDEX: 44.73 KG/M2 | HEART RATE: 74 BPM | OXYGEN SATURATION: 97 % | HEIGHT: 69 IN | SYSTOLIC BLOOD PRESSURE: 140 MMHG | DIASTOLIC BLOOD PRESSURE: 70 MMHG

## 2023-07-31 PROCEDURE — 99213 OFFICE O/P EST LOW 20 MIN: CPT

## 2023-07-31 NOTE — ASSESSMENT
[FreeTextEntry1] : Moderate JANELLE  Benefiting from the machine  Compliance with residual AHI of 1  Encouraged usage Has all his supplies  F/U in 6 months

## 2023-07-31 NOTE — HISTORY OF PRESENT ILLNESS
[TextBox_4] : 32 year old man with Aflutter s/p DCCV, planned for ablation, HFREF, history of JANELLE diagnosed in 208, was previously non compliant with CPAP and now using it. Feels his sleep quality is adequate. Will order CPAP supplies. Will need initial sleep study report and most recent compliant data. Will follow in 1 month.   7/31/23: Home sleep study with moderate JANELLE. Currently on auto PAP. Compliance reviewed. Benefiting from the machine.

## 2023-08-21 ENCOUNTER — APPOINTMENT (OUTPATIENT)
Dept: CARDIOLOGY | Facility: CLINIC | Age: 33
End: 2023-08-21
Payer: MEDICAID

## 2023-08-21 PROCEDURE — 93306 TTE W/DOPPLER COMPLETE: CPT

## 2023-09-14 ENCOUNTER — OUTPATIENT (OUTPATIENT)
Dept: OUTPATIENT SERVICES | Facility: HOSPITAL | Age: 33
LOS: 1 days | End: 2023-09-14
Payer: MEDICAID

## 2023-09-14 ENCOUNTER — RESULT REVIEW (OUTPATIENT)
Age: 33
End: 2023-09-14

## 2023-09-14 ENCOUNTER — APPOINTMENT (OUTPATIENT)
Dept: INTERNAL MEDICINE | Facility: CLINIC | Age: 33
End: 2023-09-14
Payer: MEDICAID

## 2023-09-14 VITALS
HEIGHT: 69 IN | HEART RATE: 89 BPM | TEMPERATURE: 97.1 F | BODY MASS INDEX: 46.65 KG/M2 | OXYGEN SATURATION: 97 % | SYSTOLIC BLOOD PRESSURE: 122 MMHG | DIASTOLIC BLOOD PRESSURE: 76 MMHG | WEIGHT: 315 LBS

## 2023-09-14 DIAGNOSIS — R73.03 PREDIABETES.: ICD-10-CM

## 2023-09-14 DIAGNOSIS — Z90.49 ACQUIRED ABSENCE OF OTHER SPECIFIED PARTS OF DIGESTIVE TRACT: Chronic | ICD-10-CM

## 2023-09-14 DIAGNOSIS — Z86.79 PERSONAL HISTORY OF OTHER DISEASES OF THE CIRCULATORY SYSTEM: ICD-10-CM

## 2023-09-14 DIAGNOSIS — M25.532 PAIN IN LEFT WRIST: ICD-10-CM

## 2023-09-14 DIAGNOSIS — Z00.00 ENCOUNTER FOR GENERAL ADULT MEDICAL EXAMINATION W/OUT ABNORMAL FINDINGS: ICD-10-CM

## 2023-09-14 DIAGNOSIS — Z92.89 PERSONAL HISTORY OF OTHER MEDICAL TREATMENT: Chronic | ICD-10-CM

## 2023-09-14 DIAGNOSIS — Z00.00 ENCOUNTER FOR GENERAL ADULT MEDICAL EXAMINATION WITHOUT ABNORMAL FINDINGS: ICD-10-CM

## 2023-09-14 PROCEDURE — 99214 OFFICE O/P EST MOD 30 MIN: CPT

## 2023-09-14 PROCEDURE — 73110 X-RAY EXAM OF WRIST: CPT | Mod: LT

## 2023-09-14 PROCEDURE — 99214 OFFICE O/P EST MOD 30 MIN: CPT | Mod: GC

## 2023-09-14 PROCEDURE — 73110 X-RAY EXAM OF WRIST: CPT | Mod: 26,LT

## 2023-09-15 ENCOUNTER — APPOINTMENT (OUTPATIENT)
Dept: CARDIOLOGY | Facility: CLINIC | Age: 33
End: 2023-09-15
Payer: MEDICAID

## 2023-09-15 VITALS
BODY MASS INDEX: 45.78 KG/M2 | WEIGHT: 310 LBS | DIASTOLIC BLOOD PRESSURE: 80 MMHG | HEART RATE: 80 BPM | SYSTOLIC BLOOD PRESSURE: 136 MMHG

## 2023-09-15 DIAGNOSIS — M25.532 PAIN IN LEFT WRIST: ICD-10-CM

## 2023-09-15 PROCEDURE — 99214 OFFICE O/P EST MOD 30 MIN: CPT | Mod: 25

## 2023-09-15 PROCEDURE — 93000 ELECTROCARDIOGRAM COMPLETE: CPT

## 2023-09-21 DIAGNOSIS — M25.532 PAIN IN LEFT WRIST: ICD-10-CM

## 2023-09-21 DIAGNOSIS — I42.8 OTHER CARDIOMYOPATHIES: ICD-10-CM

## 2023-09-21 DIAGNOSIS — E66.01 MORBID (SEVERE) OBESITY DUE TO EXCESS CALORIES: ICD-10-CM

## 2023-09-21 DIAGNOSIS — G47.33 OBSTRUCTIVE SLEEP APNEA (ADULT) (PEDIATRIC): ICD-10-CM

## 2023-09-21 DIAGNOSIS — I48.92 UNSPECIFIED ATRIAL FLUTTER: ICD-10-CM

## 2023-09-21 DIAGNOSIS — Z00.00 ENCOUNTER FOR GENERAL ADULT MEDICAL EXAMINATION WITHOUT ABNORMAL FINDINGS: ICD-10-CM

## 2023-09-21 DIAGNOSIS — R73.03 PREDIABETES: ICD-10-CM

## 2023-09-28 ENCOUNTER — APPOINTMENT (OUTPATIENT)
Dept: NUTRITION | Facility: CLINIC | Age: 33
End: 2023-09-28

## 2023-10-23 ENCOUNTER — APPOINTMENT (OUTPATIENT)
Dept: ELECTROPHYSIOLOGY | Facility: CLINIC | Age: 33
End: 2023-10-23
Payer: MEDICAID

## 2023-10-23 VITALS
DIASTOLIC BLOOD PRESSURE: 71 MMHG | WEIGHT: 310 LBS | HEIGHT: 69 IN | SYSTOLIC BLOOD PRESSURE: 111 MMHG | TEMPERATURE: 98 F | BODY MASS INDEX: 45.91 KG/M2

## 2023-10-23 PROCEDURE — 93000 ELECTROCARDIOGRAM COMPLETE: CPT

## 2023-10-23 PROCEDURE — 99214 OFFICE O/P EST MOD 30 MIN: CPT | Mod: 25

## 2023-10-23 RX ORDER — METOPROLOL SUCCINATE 50 MG/1
50 TABLET, EXTENDED RELEASE ORAL DAILY
Qty: 90 | Refills: 3 | Status: ACTIVE | COMMUNITY

## 2023-10-23 RX ORDER — APIXABAN 5 MG/1
5 TABLET, FILM COATED ORAL
Qty: 90 | Refills: 2 | Status: ACTIVE | COMMUNITY

## 2023-11-01 ENCOUNTER — APPOINTMENT (OUTPATIENT)
Dept: NUTRITION | Facility: CLINIC | Age: 33
End: 2023-11-01

## 2024-03-14 ENCOUNTER — APPOINTMENT (OUTPATIENT)
Dept: INTERNAL MEDICINE | Facility: CLINIC | Age: 34
End: 2024-03-14

## 2024-03-15 ENCOUNTER — APPOINTMENT (OUTPATIENT)
Dept: CARDIOLOGY | Facility: CLINIC | Age: 34
End: 2024-03-15
Payer: MEDICAID

## 2024-03-15 VITALS
BODY MASS INDEX: 45.91 KG/M2 | HEART RATE: 77 BPM | WEIGHT: 310 LBS | HEIGHT: 69 IN | DIASTOLIC BLOOD PRESSURE: 82 MMHG | SYSTOLIC BLOOD PRESSURE: 120 MMHG

## 2024-03-15 DIAGNOSIS — I48.92 UNSPECIFIED ATRIAL FLUTTER: ICD-10-CM

## 2024-03-15 DIAGNOSIS — I48.19 OTHER PERSISTENT ATRIAL FIBRILLATION: ICD-10-CM

## 2024-03-15 DIAGNOSIS — I42.8 OTHER CARDIOMYOPATHIES: ICD-10-CM

## 2024-03-15 PROCEDURE — 93000 ELECTROCARDIOGRAM COMPLETE: CPT

## 2024-03-15 PROCEDURE — 99214 OFFICE O/P EST MOD 30 MIN: CPT | Mod: 25

## 2024-03-15 NOTE — PHYSICAL EXAM
[de-identified] : obese, no distress [de-identified] : warm, no edema [de-identified] : CTA [de-identified] : alert, normal affect, logical conversation

## 2024-03-15 NOTE — ASSESSMENT
[FreeTextEntry1] : NICM Severe LV dysfunction (improved / likely tachycardia mediated)  AFL / AF s/p Ablation Remains SR

## 2024-03-15 NOTE — DISCUSSION/SUMMARY
[FreeTextEntry1] : Cont Toprol Cont Eliquis EP follow-up ECHO / follow-up 6-months [EKG obtained to assist in diagnosis and management of assessed problem(s)] : EKG obtained to assist in diagnosis and management of assessed problem(s)

## 2024-03-15 NOTE — REASON FOR VISIT
[FreeTextEntry1] : Feels great.  Physical job without exertional symptoms.  Starting to exercise.  Weight stable.  Tolerating Rx.

## 2024-04-01 ENCOUNTER — APPOINTMENT (OUTPATIENT)
Dept: PULMONOLOGY | Facility: CLINIC | Age: 34
End: 2024-04-01
Payer: SELF-PAY

## 2024-04-01 VITALS
HEART RATE: 77 BPM | SYSTOLIC BLOOD PRESSURE: 122 MMHG | OXYGEN SATURATION: 91 % | WEIGHT: 305 LBS | BODY MASS INDEX: 45.04 KG/M2 | DIASTOLIC BLOOD PRESSURE: 78 MMHG

## 2024-04-01 DIAGNOSIS — G47.33 OBSTRUCTIVE SLEEP APNEA (ADULT) (PEDIATRIC): ICD-10-CM

## 2024-04-01 DIAGNOSIS — E66.01 MORBID (SEVERE) OBESITY DUE TO EXCESS CALORIES: ICD-10-CM

## 2024-04-01 PROCEDURE — 99213 OFFICE O/P EST LOW 20 MIN: CPT

## 2024-04-01 NOTE — HISTORY OF PRESENT ILLNESS
[TextBox_4] : 32 year old man with Aflutter s/p DCCV, planned for ablation, HFREF, history of JANELLE diagnosed in 208, was previously non compliant with CPAP and now using it. Feels his sleep quality is adequate. Will order CPAP supplies. Will need initial sleep study report and most recent compliant data. Will follow in 1 month.   7/31/23: Home sleep study with moderate JANELLE. Currently on auto PAP. Compliance reviewed. Benefiting from the machine.   4/1/24: Stopped using the machine. Does not like the facemask. Ordered nasal pillows. Explained importance of using especially with his CV history.

## 2024-04-01 NOTE — ASSESSMENT
[FreeTextEntry1] : Moderate JANELLE  When he was using the CPAP his residual AHI was 1  Encouraged using again  Nasal Pillows ordered  3 months follow up

## 2024-05-30 ENCOUNTER — EMERGENCY (EMERGENCY)
Facility: HOSPITAL | Age: 34
LOS: 0 days | Discharge: HOME | End: 2024-05-30
Attending: EMERGENCY MEDICINE
Payer: SELF-PAY

## 2024-05-30 VITALS
SYSTOLIC BLOOD PRESSURE: 114 MMHG | TEMPERATURE: 98 F | DIASTOLIC BLOOD PRESSURE: 79 MMHG | HEART RATE: 72 BPM | OXYGEN SATURATION: 95 % | RESPIRATION RATE: 18 BRPM

## 2024-05-30 DIAGNOSIS — Z90.49 ACQUIRED ABSENCE OF OTHER SPECIFIED PARTS OF DIGESTIVE TRACT: Chronic | ICD-10-CM

## 2024-05-30 DIAGNOSIS — I48.91 UNSPECIFIED ATRIAL FIBRILLATION: ICD-10-CM

## 2024-05-30 DIAGNOSIS — Z98.890 OTHER SPECIFIED POSTPROCEDURAL STATES: Chronic | ICD-10-CM

## 2024-05-30 DIAGNOSIS — G47.33 OBSTRUCTIVE SLEEP APNEA (ADULT) (PEDIATRIC): ICD-10-CM

## 2024-05-30 DIAGNOSIS — M54.50 LOW BACK PAIN, UNSPECIFIED: ICD-10-CM

## 2024-05-30 DIAGNOSIS — Z92.89 PERSONAL HISTORY OF OTHER MEDICAL TREATMENT: Chronic | ICD-10-CM

## 2024-05-30 PROCEDURE — 96372 THER/PROPH/DIAG INJ SC/IM: CPT

## 2024-05-30 PROCEDURE — 99284 EMERGENCY DEPT VISIT MOD MDM: CPT

## 2024-05-30 PROCEDURE — 99283 EMERGENCY DEPT VISIT LOW MDM: CPT | Mod: 25

## 2024-05-30 RX ORDER — METHOCARBAMOL 500 MG/1
1 TABLET, FILM COATED ORAL
Qty: 7 | Refills: 0
Start: 2024-05-30 | End: 2024-06-05

## 2024-05-30 RX ORDER — KETOROLAC TROMETHAMINE 30 MG/ML
60 SYRINGE (ML) INJECTION ONCE
Refills: 0 | Status: DISCONTINUED | OUTPATIENT
Start: 2024-05-30 | End: 2024-05-30

## 2024-05-30 RX ORDER — METHOCARBAMOL 500 MG/1
1000 TABLET, FILM COATED ORAL ONCE
Refills: 0 | Status: COMPLETED | OUTPATIENT
Start: 2024-05-30 | End: 2024-05-30

## 2024-05-30 RX ORDER — IBUPROFEN 200 MG
1 TABLET ORAL
Qty: 28 | Refills: 0
Start: 2024-05-30 | End: 2024-06-05

## 2024-05-30 RX ADMIN — Medication 60 MILLIGRAM(S): at 13:11

## 2024-05-30 RX ADMIN — METHOCARBAMOL 1000 MILLIGRAM(S): 500 TABLET, FILM COATED ORAL at 13:11

## 2024-05-30 NOTE — ED PROVIDER NOTE - OBJECTIVE STATEMENT
33-year-old male history of A-fib status post ablation, JANELLE, now presents with right lower back pain since last night, persistent denies radiation, sx are constant, worse with certain movements and position, better with rest, denies fever, tactile temp, chills, paresthesias, focal weakness, abdominal or chest pain, bowel or bladder dysfunction, urinary sx, LE weakness, saddle anesthesia, or other associated complaints at present. + recent heavy lifting, no trauma. Old chart reviewed. I have reviewed and agree with the initial nursing note, except as documented in my note.

## 2024-05-30 NOTE — ED PROVIDER NOTE - PHYSICAL EXAMINATION
VSS, awake, alert, chest CTAB, +S1/S2, RRR, abdomen soft, NT, no pulsatile masses or bruits appreciated, no midline spinal tenderness, step-offs or deformities, no erythema, swelling or ecchymosis, no skin rash or lesions, able to dorsiflex b/l great toe, no foot drop, motor and sensation intact b/l LE and equal, NV intact, normal gait.

## 2024-05-30 NOTE — ED PROVIDER NOTE - PATIENT PORTAL LINK FT
You can access the FollowMyHealth Patient Portal offered by [facility] by registering at the following website: [email or web address]. By joining [employer]’s FollowMyHealth portal, you will also be able to view your health information using other applications (apps) compatible with our system.

## 2024-05-30 NOTE — ED PROVIDER NOTE - NSFOLLOWUPINSTRUCTIONS_ED_ALL_ED_FT
Follow up with your doctor in 2-3 days.  If you do not have a doctor, CALL [phone] to find a physician to follow up with.      Acute Back Pain, Adult  Acute back pain is sudden and usually short-lived. It is often caused by an injury to the muscles and tissues in the back. The injury may result from:  A muscle, tendon, or ligament getting overstretched or torn. Ligaments are tissues that connect bones to each other. Lifting something improperly can cause a back strain.  Wear and tear (degeneration) of the spinal disks. Spinal disks are circular tissue that provide cushioning between the bones of the spine (vertebrae).  Twisting motions, such as while playing sports or doing yard work.  A hit to the back.  Arthritis.  You may have a physical exam, lab tests, and imaging tests to find the cause of your pain. Acute back pain usually goes away with rest and home care.    Follow these instructions at home:  Managing pain, stiffness, and swelling    Take over-the-counter and prescription medicines only as told by your health care provider. Treatment may include medicines for pain and inflammation that are taken by mouth or applied to the skin, or muscle relaxants.  Your health care provider may recommend applying ice during the first 24–48 hours after your pain starts. To do this:  Put ice in a plastic bag.  Place a towel between your skin and the bag.  Leave the ice on for 20 minutes, 2–3 times a day.  Remove the ice if your skin turns bright red. This is very important. If you cannot feel pain, heat, or cold, you have a greater risk of damage to the area.  If directed, apply heat to the affected area as often as told by your health care provider. Use the heat source that your health care provider recommends, such as a moist heat pack or a heating pad.  Place a towel between your skin and the heat source.  Leave the heat on for 20–30 minutes.  Remove the heat if your skin turns bright red. This is especially important if you are unable to feel pain, heat, or cold. You have a greater risk of getting burned.  Activity    Comparisons of good and bad posture while driving, standing, sitting at a desk, and lifting heavy objects.  Do not stay in bed. Staying in bed for more than 1–2 days can delay your recovery.  Sit up and stand up straight. Avoid leaning forward when you sit or hunching over when you stand.  If you work at a desk, sit close to it so you do not need to lean over. Keep your chin tucked in. Keep your neck drawn back, and keep your elbows bent at a 90-degree angle (right angle).  Sit high and close to the steering wheel when you drive. Add lower back (lumbar) support to your car seat, if needed.  Take short walks on even surfaces as soon as you are able. Try to increase the length of time you walk each day.  Do not sit, drive, or  one place for more than 30 minutes at a time. Sitting or standing for long periods of time can put stress on your back.  Do not drive or use heavy machinery while taking prescription pain medicine.  Use proper lifting techniques. When you bend and lift, use positions that put less stress on your back:  Bend your knees.  Keep the load close to your body.  Avoid twisting.  Exercise regularly as told by your health care provider. Exercising helps your back heal faster and helps prevent back injuries by keeping muscles strong and flexible.  Work with a physical therapist to make a safe exercise program, as recommended by your health care provider. Do any exercises as told by your physical therapist.  Lifestyle    Maintain a healthy weight. Extra weight puts stress on your back and makes it difficult to have good posture.  Avoid activities or situations that make you feel anxious or stressed. Stress and anxiety increase muscle tension and can make back pain worse. Learn ways to manage anxiety and stress, such as through exercise.  General instructions    Sleep on a firm mattress in a comfortable position. Try lying on your side with your knees slightly bent. If you lie on your back, put a pillow under your knees.  Keep your head and neck in a straight line with your spine (neutral position) when using electronic equipment like smartphones or pads. To do this:  Raise your smartphone or pad to look at it instead of bending your head or neck to look down.  Put the smartphone or pad at the level of your face while looking at the screen.  Follow your treatment plan as told by your health care provider. This may include:  Cognitive or behavioral therapy.  Acupuncture or massage therapy.  Meditation or yoga.  Contact a health care provider if:  You have pain that is not relieved with rest or medicine.  You have increasing pain going down into your legs or buttocks.  Your pain does not improve after 2 weeks.  You have pain at night.  You lose weight without trying.  You have a fever or chills.  You develop nausea or vomiting.  You develop abdominal pain.  Get help right away if:  You develop new bowel or bladder control problems.  You have unusual weakness or numbness in your arms or legs.  You feel faint.  These symptoms may represent a serious problem that is an emergency. Do not wait to see if the symptoms will go away. Get medical help right away. Call your local emergency services (911 in the [place] Do not drive yourself to the hospital.    Summary  Acute back pain is sudden and usually short-lived.  Use proper lifting techniques. When you bend and lift, use positions that put less stress on your back.  Take over-the-counter and prescription medicines only as told by your health care provider, and apply heat or ice as told.

## 2024-07-02 ENCOUNTER — APPOINTMENT (OUTPATIENT)
Dept: PULMONOLOGY | Facility: CLINIC | Age: 34
End: 2024-07-02

## 2024-09-13 ENCOUNTER — APPOINTMENT (OUTPATIENT)
Dept: CARDIOLOGY | Facility: CLINIC | Age: 34
End: 2024-09-13
Payer: COMMERCIAL

## 2024-09-13 VITALS
HEART RATE: 96 BPM | WEIGHT: 306 LBS | HEIGHT: 69 IN | BODY MASS INDEX: 45.32 KG/M2 | DIASTOLIC BLOOD PRESSURE: 80 MMHG | SYSTOLIC BLOOD PRESSURE: 128 MMHG | OXYGEN SATURATION: 99 %

## 2024-09-13 DIAGNOSIS — I48.92 UNSPECIFIED ATRIAL FLUTTER: ICD-10-CM

## 2024-09-13 DIAGNOSIS — I48.19 OTHER PERSISTENT ATRIAL FIBRILLATION: ICD-10-CM

## 2024-09-13 DIAGNOSIS — I42.8 OTHER CARDIOMYOPATHIES: ICD-10-CM

## 2024-09-13 DIAGNOSIS — I50.20 UNSPECIFIED SYSTOLIC (CONGESTIVE) HEART FAILURE: ICD-10-CM

## 2024-09-13 PROCEDURE — 93000 ELECTROCARDIOGRAM COMPLETE: CPT

## 2024-09-13 PROCEDURE — 93306 TTE W/DOPPLER COMPLETE: CPT

## 2024-09-13 PROCEDURE — 99213 OFFICE O/P EST LOW 20 MIN: CPT

## 2024-09-13 NOTE — REASON FOR VISIT
[FreeTextEntry1] : Feels well.  Physical job without exertional symptoms.  Weight stable.  Off medication about 6 months.  Apparent insurance issue.  ECHO today demonstrates normal LV function.

## 2024-09-13 NOTE — ASSESSMENT
[FreeTextEntry1] : NICM Severe LV dysfunction (improved / likely tachycardia mediated)  AFL / AF s/p Ablation Remains SR [id number] = 0-1

## 2024-09-13 NOTE — DISCUSSION/SUMMARY
[FreeTextEntry1] : Follow-up with Dr. Kirk as planned.  ILR for AF monitoring seems reasonable given off anticoagulation and history of tachycardia cardiomyopathy / CHF with antecedent asymptomatic AF.  Follow-up 6-months. [EKG obtained to assist in diagnosis and management of assessed problem(s)] : EKG obtained to assist in diagnosis and management of assessed problem(s)

## 2024-09-13 NOTE — PHYSICAL EXAM
[de-identified] : obese, no distress [de-identified] : alert, normal affect, logical conversation

## 2024-10-21 ENCOUNTER — APPOINTMENT (OUTPATIENT)
Dept: ELECTROPHYSIOLOGY | Facility: CLINIC | Age: 34
End: 2024-10-21
Payer: COMMERCIAL

## 2024-10-21 VITALS
BODY MASS INDEX: 44.43 KG/M2 | DIASTOLIC BLOOD PRESSURE: 89 MMHG | HEIGHT: 69 IN | TEMPERATURE: 97.1 F | WEIGHT: 300 LBS | SYSTOLIC BLOOD PRESSURE: 138 MMHG | HEART RATE: 74 BPM

## 2024-10-21 DIAGNOSIS — I48.92 UNSPECIFIED ATRIAL FLUTTER: ICD-10-CM

## 2024-10-21 DIAGNOSIS — G47.33 OBSTRUCTIVE SLEEP APNEA (ADULT) (PEDIATRIC): ICD-10-CM

## 2024-10-21 DIAGNOSIS — I48.19 OTHER PERSISTENT ATRIAL FIBRILLATION: ICD-10-CM

## 2024-10-21 PROCEDURE — 93000 ELECTROCARDIOGRAM COMPLETE: CPT

## 2024-10-21 PROCEDURE — 99214 OFFICE O/P EST MOD 30 MIN: CPT

## 2024-10-28 ENCOUNTER — APPOINTMENT (OUTPATIENT)
Dept: CARDIOLOGY | Facility: CLINIC | Age: 34
End: 2024-10-28
Payer: COMMERCIAL

## 2024-10-28 VITALS
HEART RATE: 93 BPM | OXYGEN SATURATION: 98 % | DIASTOLIC BLOOD PRESSURE: 80 MMHG | SYSTOLIC BLOOD PRESSURE: 120 MMHG | HEIGHT: 69 IN | WEIGHT: 310 LBS | BODY MASS INDEX: 45.91 KG/M2

## 2024-10-28 DIAGNOSIS — R73.03 PREDIABETES.: ICD-10-CM

## 2024-10-28 DIAGNOSIS — E66.01 MORBID (SEVERE) OBESITY DUE TO EXCESS CALORIES: ICD-10-CM

## 2024-10-28 DIAGNOSIS — Z71.82 EXERCISE COUNSELING: ICD-10-CM

## 2024-10-28 DIAGNOSIS — Z71.3 DIETARY COUNSELING AND SURVEILLANCE: ICD-10-CM

## 2024-10-28 PROCEDURE — 99204 OFFICE O/P NEW MOD 45 MIN: CPT

## 2024-10-28 RX ORDER — METOPROLOL SUCCINATE 50 MG/1
50 TABLET, EXTENDED RELEASE ORAL
Refills: 0 | Status: ACTIVE | COMMUNITY

## 2024-11-07 PROBLEM — Z71.3 DIETARY COUNSELING: Status: ACTIVE | Noted: 2024-11-07

## 2024-11-07 PROBLEM — Z71.82 EXERCISE COUNSELING: Status: ACTIVE | Noted: 2024-11-07

## 2024-11-25 ENCOUNTER — EMERGENCY (EMERGENCY)
Facility: HOSPITAL | Age: 34
LOS: 0 days | Discharge: HOME | End: 2024-11-25
Attending: STUDENT IN AN ORGANIZED HEALTH CARE EDUCATION/TRAINING PROGRAM
Payer: COMMERCIAL

## 2024-11-25 VITALS
TEMPERATURE: 98 F | HEIGHT: 70 IN | HEART RATE: 52 BPM | DIASTOLIC BLOOD PRESSURE: 84 MMHG | RESPIRATION RATE: 20 BRPM | SYSTOLIC BLOOD PRESSURE: 127 MMHG | WEIGHT: 309.09 LBS | OXYGEN SATURATION: 98 %

## 2024-11-25 DIAGNOSIS — Z92.89 PERSONAL HISTORY OF OTHER MEDICAL TREATMENT: Chronic | ICD-10-CM

## 2024-11-25 DIAGNOSIS — H57.12 OCULAR PAIN, LEFT EYE: ICD-10-CM

## 2024-11-25 DIAGNOSIS — H53.8 OTHER VISUAL DISTURBANCES: ICD-10-CM

## 2024-11-25 DIAGNOSIS — Z90.49 ACQUIRED ABSENCE OF OTHER SPECIFIED PARTS OF DIGESTIVE TRACT: Chronic | ICD-10-CM

## 2024-11-25 DIAGNOSIS — H57.8A2 FOREIGN BODY SENSATION, LEFT EYE: ICD-10-CM

## 2024-11-25 DIAGNOSIS — Z98.890 OTHER SPECIFIED POSTPROCEDURAL STATES: Chronic | ICD-10-CM

## 2024-11-25 PROCEDURE — 99283 EMERGENCY DEPT VISIT LOW MDM: CPT

## 2024-11-25 RX ORDER — OFLOXACIN 3 MG/ML
1 SOLUTION OPHTHALMIC ONCE
Refills: 0 | Status: COMPLETED | OUTPATIENT
Start: 2024-11-25 | End: 2024-11-25

## 2024-11-25 RX ORDER — TETRACAINE HYDROCHLORIDE 5 MG/ML
1 SOLUTION OPHTHALMIC ONCE
Refills: 0 | Status: DISCONTINUED | OUTPATIENT
Start: 2024-11-25 | End: 2024-11-25

## 2024-11-25 RX ADMIN — OFLOXACIN 1 DROP(S): 3 SOLUTION OPHTHALMIC at 13:03

## 2024-11-25 NOTE — ED PROVIDER NOTE - OBJECTIVE STATEMENT
Pt is a 34 y.o Male with no significant PMHx who presents to the ED with chief complaint of left eye pain s/p injury. Pt states he was using a leaf blower pta and believes something flew into his left eye. Pt states he has foreign body sensation and left sided blurry vision. Denies any other injuries. Pt states he does wear contact lenses for being nearsighted. Denies any other symptoms.

## 2024-11-25 NOTE — ED PROVIDER NOTE - NSPTACCESSSVCSAPPTDETAILS_ED_ALL_ED_FT
pt with left eye pain and blurriness after using leaf blower. + foreign body sensation. no corneal abrasion noted with fluorescin dye. pt placed on ofloxacin drops . pt wear contact lenses

## 2024-11-25 NOTE — ED PROVIDER NOTE - CLINICAL SUMMARY MEDICAL DECISION MAKING FREE TEXT BOX
34 y m c/o L eye pain after using his leaf blower. intermittent contact use. does not feel a foreign body. no vision change.     neg floursecin stain; no foreign body identified.     supportive care, abx    Appropriate medications for patient's presenting complaints were ordered and effects were reassessed.  Patient's records (prior hospital, ED visit, and/or nursing home notes if available) were reviewed.  Additional history was obtained from EMS, family, and/or PCP (where available).  Escalation to admission/observation was considered.  However patient feels much better and is comfortable with discharge.  Appropriate follow-up was arranged. home

## 2024-11-25 NOTE — ED PROVIDER NOTE - PATIENT PORTAL LINK FT
You can access the FollowMyHealth Patient Portal offered by [facility] by registering at the following website: [email or web address]. By joining [employer]’s FollowMyHealth portal, you will also be able to view your health information using other applications (apps) compatible with our system.

## 2024-11-25 NOTE — ED PROVIDER NOTE - NSFOLLOWUPINSTRUCTIONS_ED_ALL_ED_FT
Our Emergency Department Referral Coordinators will be reaching out to you in the next 24-48 hours from 9:00am to 5:00pm to schedule a follow up appointment. Please expect a phone call from the hospital in that time frame. If you do not receive a call or if you have any questions or concerns, you can reach them at   [phone].  Blurred Vision    WHAT YOU NEED TO KNOW:    Blurred vision is when you cannot see fine details. You may have blurred vision if you are nearsighted or farsighted and you need glasses. Blurred vision may be caused by a corneal abrasion (scratch on the cornea) or a corneal ulcer (open sore). You may have blurred vision if your eye came into contact with a chemical. A foreign body or infection may also cause blurred vision. Medical conditions, such as cataracts, glaucoma, detached retina, and nerve disorders can also cause blurred vision. Blurred vision may also be caused by a concussion or a tumor. If you have diabetes, you may develop diabetic retinopathy. Diabetic retinopathy damages the blood vessels of your retina.  Eye Anatomy    DISCHARGE INSTRUCTIONS:    Return to the emergency department if:    You have weakness in an arm or leg, difficulty speaking or seeing, and a severe headache.    You have a fever, eye pain, or discharge.    You have a sudden loss of vision.  Contact your healthcare provider if:    Your blurred vision gets worse.    Your blurred vision is worse in the morning.    You have a sudden headache or eye pain.    Your eye has swelling, redness, or discharge.    You see floaters, flashes of light, fine dots, or cobweb shapes.    You have questions or concerns about your condition or care.  Medicines: You may need any of the following:    Prescription pain medicine may be given. Ask how to take this medicine safely.    Antibiotics help prevent or treat an eye infection caused by bacteria. It may be given as eyedrops or an ointment.    Take your medicine as directed. Contact your healthcare provider if you think your medicine is not helping or if you have side effects. Tell your provider if you are allergic to any medicine. Keep a list of the medicines, vitamins, and herbs you take. Include the amounts, and when and why you take them. Bring the list or the pill bottles to follow-up visits. Carry your medicine list with you in case of an emergency.  Manage your blurred vision: Your healthcare provider may ask you to do any of the following:    Use artificial tears to keep your eye moist or to soothe your irritated eye.    Apply a cool compress to decrease any swelling or pain. Wet a clean washcloth with cool water and place it on your eye. Use the cool compress as often as directed.    Wear an eye patch as directed to protect your eye.  Eye Patch  Follow up with your healthcare provider as directed: You may need other eye exams and medicines. Write down your questions so you remember to ask them during your visits.

## 2024-11-25 NOTE — ED ADULT NURSE NOTE - CCCP TRG CHIEF CMPLNT
[Post Void Residual ____ ml] : Post Void Residual was [unfilled] ml [Soft and Nontender] : soft and nontender [Clean, Dry, Intact] : Clean, Dry, Intact [Good Support] : Good support [Healing well] : healing well [No Masses or Tenderness] : no masses or tenderness [FreeTextEntry2] : Intact, 2 clear stitch left. eye pain traumatic

## 2024-11-25 NOTE — ED ADULT NURSE NOTE - NSICDXPASTMEDICALHX_GEN_ALL_CORE_FT
PAST MEDICAL HISTORY:  Afib     CHF (congestive heart failure)     Obese     JANELLE (obstructive sleep apnea)

## 2024-11-25 NOTE — ED PROVIDER NOTE - PRO INTERPRETER NEED 2
H&P reviewed  After examining the patient I find no changes in the patients condition since the H&P had been written 
English

## 2024-11-25 NOTE — ED PROVIDER NOTE - PHYSICAL EXAMINATION
VITAL SIGNS: noted  CONSTITUTIONAL: Well-developed; well-nourished; in no acute distress  HEAD: Normocephalic; atraumatic  EYES: PERRL, EOM intact; conjunctiva and sclera clear. no uptake on flourescin dye noted. no foreign body noted. no chemosis no proptosis visual acuity 20/20 b/l (corrected)  ENT: No nasal discharge, MMM, oropharynx clear without tonsillar hypertrophy or exudates  NECK: Supple; non tender. No anterior cervical lymphadenopathy noted  CARD: S1, S2 normal; no murmurs, gallops, or rubs. Regular rate and rhythm  RESP: CTAB/L, no wheezes, rales or rhonchi  NEURO: Awake and alert, oriented. Grossly unremarkable. No focal deficits.  SKIN: Skin exam is warm and dry, no acute rash

## 2024-11-26 NOTE — CHART NOTE - NSCHARTNOTEFT_GEN_A_CORE
switched to PCP as per instr from Dr. Coats- AC / pt has self scheduled pcp appt 11/26 - DK (PCP Referral)

## 2024-12-02 ENCOUNTER — APPOINTMENT (OUTPATIENT)
Dept: CARDIOLOGY | Facility: CLINIC | Age: 34
End: 2024-12-02

## 2024-12-02 PROCEDURE — 99441: CPT

## 2025-02-20 NOTE — PRE-ANESTHESIA EVALUATION ADULT - ANESTHESIA, PREVIOUS REACTION, PROFILE
Problem: At Risk for Falls  Goal: Patient does not fall  Outcome: Monitoring/Evaluating progress  Goal: Patient takes action to control fall-related risks  Outcome: Monitoring/Evaluating progress     Problem: Skin Integrity Alteration  Goal: Skin remains intact with no new/deterioration of wound or pressure injury  Outcome: Monitoring/Evaluating progress     Problem: Pain  Goal: Acceptable pain level achieved/maintained at rest using appropriate pain scale for the patient  Outcome: Monitoring/Evaluating progress     Problem: Impaired Physical Mobility  Goal: Functional status is maintained or returned to baseline during hospitalization  Outcome: Monitoring/Evaluating progress     Problem: Hemodialysis  Goal: Dialysis: Safe, effective, and comfortable hemodialysis treatment (Hemodialysis nurse only)  Outcome: Monitoring/Evaluating progress      none

## 2025-03-14 ENCOUNTER — APPOINTMENT (OUTPATIENT)
Dept: CARDIOLOGY | Facility: CLINIC | Age: 35
End: 2025-03-14
Payer: COMMERCIAL

## 2025-03-14 ENCOUNTER — NON-APPOINTMENT (OUTPATIENT)
Age: 35
End: 2025-03-14

## 2025-03-14 VITALS
HEIGHT: 69 IN | DIASTOLIC BLOOD PRESSURE: 82 MMHG | SYSTOLIC BLOOD PRESSURE: 122 MMHG | HEART RATE: 68 BPM | BODY MASS INDEX: 45.91 KG/M2 | WEIGHT: 310 LBS

## 2025-03-14 DIAGNOSIS — I42.8 OTHER CARDIOMYOPATHIES: ICD-10-CM

## 2025-03-14 DIAGNOSIS — I48.92 UNSPECIFIED ATRIAL FLUTTER: ICD-10-CM

## 2025-03-14 DIAGNOSIS — I50.20 UNSPECIFIED SYSTOLIC (CONGESTIVE) HEART FAILURE: ICD-10-CM

## 2025-03-14 DIAGNOSIS — E66.01 MORBID (SEVERE) OBESITY DUE TO EXCESS CALORIES: ICD-10-CM

## 2025-03-14 PROCEDURE — 99213 OFFICE O/P EST LOW 20 MIN: CPT

## 2025-03-27 ENCOUNTER — APPOINTMENT (OUTPATIENT)
Dept: CARDIOLOGY | Facility: CLINIC | Age: 35
End: 2025-03-27
Payer: COMMERCIAL

## 2025-03-27 VITALS
OXYGEN SATURATION: 97 % | HEIGHT: 69 IN | DIASTOLIC BLOOD PRESSURE: 84 MMHG | HEART RATE: 73 BPM | WEIGHT: 314 LBS | BODY MASS INDEX: 46.51 KG/M2 | SYSTOLIC BLOOD PRESSURE: 138 MMHG

## 2025-03-27 DIAGNOSIS — Z71.3 DIETARY COUNSELING AND SURVEILLANCE: ICD-10-CM

## 2025-03-27 DIAGNOSIS — Z71.82 EXERCISE COUNSELING: ICD-10-CM

## 2025-03-27 DIAGNOSIS — R73.03 PREDIABETES.: ICD-10-CM

## 2025-03-27 DIAGNOSIS — E66.01 MORBID (SEVERE) OBESITY DUE TO EXCESS CALORIES: ICD-10-CM

## 2025-03-27 PROCEDURE — 99214 OFFICE O/P EST MOD 30 MIN: CPT

## 2025-05-08 ENCOUNTER — APPOINTMENT (OUTPATIENT)
Dept: CARDIOLOGY | Facility: CLINIC | Age: 35
End: 2025-05-08
Payer: COMMERCIAL

## 2025-05-08 VITALS
DIASTOLIC BLOOD PRESSURE: 76 MMHG | OXYGEN SATURATION: 98 % | SYSTOLIC BLOOD PRESSURE: 118 MMHG | WEIGHT: 308 LBS | BODY MASS INDEX: 45.62 KG/M2 | HEART RATE: 75 BPM | HEIGHT: 69 IN

## 2025-05-08 DIAGNOSIS — E66.01 MORBID (SEVERE) OBESITY DUE TO EXCESS CALORIES: ICD-10-CM

## 2025-05-08 DIAGNOSIS — Z71.82 EXERCISE COUNSELING: ICD-10-CM

## 2025-05-08 DIAGNOSIS — Z71.89 OTHER SPECIFIED COUNSELING: ICD-10-CM

## 2025-05-08 DIAGNOSIS — Z71.3 DIETARY COUNSELING AND SURVEILLANCE: ICD-10-CM

## 2025-05-08 PROCEDURE — 99214 OFFICE O/P EST MOD 30 MIN: CPT

## 2025-05-15 PROBLEM — Z71.89 COUNSELING ON HEALTH PROMOTION AND DISEASE PREVENTION: Status: ACTIVE | Noted: 2025-05-15

## 2025-06-02 ENCOUNTER — APPOINTMENT (OUTPATIENT)
Dept: PULMONOLOGY | Facility: CLINIC | Age: 35
End: 2025-06-02

## 2025-06-17 ENCOUNTER — EMERGENCY (EMERGENCY)
Facility: HOSPITAL | Age: 35
LOS: 0 days | Discharge: HOME | End: 2025-06-17
Attending: STUDENT IN AN ORGANIZED HEALTH CARE EDUCATION/TRAINING PROGRAM
Payer: COMMERCIAL

## 2025-06-17 VITALS
OXYGEN SATURATION: 95 % | WEIGHT: 307.99 LBS | HEART RATE: 88 BPM | HEIGHT: 69 IN | DIASTOLIC BLOOD PRESSURE: 85 MMHG | SYSTOLIC BLOOD PRESSURE: 127 MMHG | TEMPERATURE: 98 F | RESPIRATION RATE: 18 BRPM
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
## 2025-06-17 DIAGNOSIS — Z98.890 OTHER SPECIFIED POSTPROCEDURAL STATES: Chronic | ICD-10-CM

## 2025-06-17 DIAGNOSIS — M54.50 LOW BACK PAIN, UNSPECIFIED: ICD-10-CM

## 2025-06-17 DIAGNOSIS — H57.89 OTHER SPECIFIED DISORDERS OF EYE AND ADNEXA: ICD-10-CM

## 2025-06-17 DIAGNOSIS — Z90.49 ACQUIRED ABSENCE OF OTHER SPECIFIED PARTS OF DIGESTIVE TRACT: Chronic | ICD-10-CM

## 2025-06-17 DIAGNOSIS — I48.92 UNSPECIFIED ATRIAL FLUTTER: ICD-10-CM
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
## 2025-06-17 DIAGNOSIS — Z92.89 PERSONAL HISTORY OF OTHER MEDICAL TREATMENT: Chronic | ICD-10-CM

## 2025-06-17 DIAGNOSIS — I48.91 UNSPECIFIED ATRIAL FIBRILLATION: ICD-10-CM

## 2025-06-17 PROCEDURE — 99283 EMERGENCY DEPT VISIT LOW MDM: CPT | Mod: 25

## 2025-06-17 PROCEDURE — 96372 THER/PROPH/DIAG INJ SC/IM: CPT

## 2025-06-17 PROCEDURE — 99284 EMERGENCY DEPT VISIT MOD MDM: CPT

## 2025-06-17 RX ORDER — CYCLOBENZAPRINE HYDROCHLORIDE 15 MG/1
1 CAPSULE, EXTENDED RELEASE ORAL
Qty: 21 | Refills: 0
Start: 2025-06-17 | End: 2025-06-23

## 2025-06-17 RX ORDER — FLUORESCEIN SODIUM 0.6 MG
1 STRIP OPHTHALMIC (EYE) ONCE
Refills: 0 | Status: DISCONTINUED | OUTPATIENT
Start: 2025-06-17 | End: 2025-06-17

## 2025-06-17 RX ORDER — LIDOCAINE HYDROCHLORIDE 20 MG/ML
1 JELLY TOPICAL
Qty: 4 | Refills: 0
Start: 2025-06-17 | End: 2025-06-21

## 2025-06-17 RX ORDER — CYCLOBENZAPRINE HYDROCHLORIDE 15 MG/1
10 CAPSULE, EXTENDED RELEASE ORAL ONCE
Refills: 0 | Status: COMPLETED | OUTPATIENT
Start: 2025-06-17 | End: 2025-06-17

## 2025-06-17 RX ORDER — TETRACAINE HYDROCHLORIDE 5 MG/ML
1 SOLUTION OPHTHALMIC ONCE
Refills: 0 | Status: DISCONTINUED | OUTPATIENT
Start: 2025-06-17 | End: 2025-06-17

## 2025-06-17 RX ORDER — LIDOCAINE HYDROCHLORIDE 20 MG/ML
1 JELLY TOPICAL ONCE
Refills: 0 | Status: COMPLETED | OUTPATIENT
Start: 2025-06-17 | End: 2025-06-17

## 2025-06-17 RX ORDER — IBUPROFEN 200 MG
3 TABLET ORAL
Qty: 84 | Refills: 0
Start: 2025-06-17 | End: 2025-06-23

## 2025-06-17 RX ORDER — KETOROLAC TROMETHAMINE 30 MG/ML
30 INJECTION, SOLUTION INTRAMUSCULAR; INTRAVENOUS ONCE
Refills: 0 | Status: DISCONTINUED | OUTPATIENT
Start: 2025-06-17 | End: 2025-06-17

## 2025-06-17 RX ORDER — OFLOXACIN 3 MG/ML
1 SOLUTION OPHTHALMIC ONCE
Refills: 0 | Status: COMPLETED | OUTPATIENT
Start: 2025-06-17 | End: 2025-06-17

## 2025-06-17 RX ADMIN — OFLOXACIN 1 DROP(S): 3 SOLUTION OPHTHALMIC at 11:50

## 2025-06-17 RX ADMIN — LIDOCAINE HYDROCHLORIDE 1 PATCH: 20 JELLY TOPICAL at 11:50

## 2025-06-17 RX ADMIN — KETOROLAC TROMETHAMINE 30 MILLIGRAM(S): 30 INJECTION, SOLUTION INTRAMUSCULAR; INTRAVENOUS at 11:48

## 2025-06-17 RX ADMIN — CYCLOBENZAPRINE HYDROCHLORIDE 10 MILLIGRAM(S): 15 CAPSULE, EXTENDED RELEASE ORAL at 11:48

## 2025-06-17 NOTE — ED PROVIDER NOTE - NSFOLLOWUPINSTRUCTIONS_ED_ALL_ED_FT
Physical Therapy   Our Emergency Department Referral Coordinators will be reaching out to you in the next 24-48 hours from 9:00am to 5:00pm with a follow up appointment. Please expect a phone call from the hospital in that time frame. If you do not receive a call or if you have any questions or concerns, you can reach them at   [phone]     Back Pain    Back pain is very common in adults. The cause of back pain is rarely dangerous and the pain often gets better over time. The cause of your back pain may not be known and may include strain of muscles or ligaments, degeneration of the spinal disks, or arthritis. Occasionally the pain may radiate down your leg(s). Over-the-counter medicines to reduce pain and inflammation are often the most helpful. Stretching and remaining active frequently helps the healing process.     SEEK IMMEDIATE MEDICAL CARE IF YOU HAVE ANY OF THE FOLLOWING SYMPTOMS: bowel or bladder control problems, unusual weakness or numbness in your arms or legs, nausea or vomiting, abdominal pain, fever, dizziness/lightheadedness.

## 2025-06-17 NOTE — ED PROVIDER NOTE - ATTENDING CONTRIBUTION TO CARE
34-year-old male with no pertinent past medical history presented to ED for 2 complaints.  Patient states was lifting up heavy object and started having back pain.  Denies any direct trauma.  Denies numbness or weakness in the body. No urinary problem.  Patient also reports Sunday was using dust blower and  likely dust went to his left eye.  Denies any vision change.  CONSTITUTIONAL: Well developed; in no acute distress  HEAD: Normocephalic; atraumatic  EYES: No conjunctival injection, no scleral icterus fluorescein reuptake on left eye  ENT: No nasal discharge; airway clear.  NECK: Supple; non tender.  CARD: Warm and well perfused, not tachycardic  RESP: Breathing comfortably on RA, speaking in full sentences w/o distress  Abdomen: Soft, non-tender, non-distended   EXT: No gross deformities, normal ROM. tenderness on left paraspinal area  SKIN: Warm and dry  NEURO: Alert, oriented, motor and sensory grossly intact  PSYCH: Calm, cooperative

## 2025-06-17 NOTE — ED PROVIDER NOTE - PROGRESS NOTE DETAILS
kz: Eye staining reveals no corneal abrasions.  Patient will be given ABX for conjunctivitis.  Return precautions given.  Patient agreeable to plan.

## 2025-06-17 NOTE — ED PROVIDER NOTE - PATIENT PORTAL LINK FT
You can access the FollowMyHealth Patient Portal offered by [facility] by registering at the following website: [email or web address]. By joining [email or web address]’s FollowMyHealth portal, you will also be able to view your health information using other applications (apps) compatible with our system.

## 2025-06-17 NOTE — ED PROVIDER NOTE - OBJECTIVE STATEMENT
34-year-old male with past medical history of prior ablation for persistent A-fib/a flutter presenting to the ED with back pain and eye redness.  Patient states for the past few days he has had lower back pain after lifting some heavy boxes.  Patient woke up more sore today.  Patient did not take anything for pain prior to arrival.  Patient is also complaining about redness to his left eye since Sunday.  Patient states he was leaf blowing some leaves when something hit his eye.  No other concerns at this time.  No nausea, vomiting, fevers, chills, chest pain, shortness of breath, abdominal pain, saddle anesthesia, urinary or fecal retention/incontinence

## 2025-06-17 NOTE — ED PROVIDER NOTE - CLINICAL SUMMARY MEDICAL DECISION MAKING FREE TEXT BOX
34-year-old male with no pertinent past medical history presented to ED for 2 complaints.  Patient states was lifting up heavy object and started having back pain.  Denies any direct trauma.  Denies numbness or weakness in the body. No urinary problem.  Patient also reports Sunday was using dust blower and  likely dust went to his left eye. Exam showed left corneal abrasion given antibiotics.  Consult back pain improved with medications discharged with outpatient physical therapy follow-up.

## 2025-06-17 NOTE — ED ADULT TRIAGE NOTE - GLASGOW COMA SCALE: SCORE, MLM
Follow up bilateral shoulder pain.  Left shoulder is doing well s/p steroid injection last week.  Requesting steroid injection into the right shoulder today.  Right shoulder, 9/10 with use.   15

## 2025-06-17 NOTE — ED PROVIDER NOTE - PHYSICAL EXAMINATION
CONSTITUTIONAL: well-appearing, in NAD  SKIN: Warm dry, normal skin turgor  HEAD: NCAT  EYES: EOMI, PERRLA, no scleral icterus, L conjunctival injection   ENT: normal pharynx with no erythema or exudates  NECK: Supple; non tender. Full ROM.  CARD: RRR  RESP: clear to ausculation b/l. No crackles or wheezing.  ABD: soft, non-tender, non-distended, no rebound or guarding.  EXT: Full ROM, no bony tenderness, no pedal edema, no calf tenderness  NEURO: normal motor. normal sensory. CN II-XII intact. Cerebellar testing normal. Normal gait.  BACK: TTP paraspinal lumbar back w/ muscle spasm on R   PSYCH: Cooperative, appropriate.

## 2025-06-24 NOTE — ED PROVIDER NOTE - ATTENDING CONTRIBUTION TO CARE
"  Assessment and Plan    (E66.01) Morbid obesity (H)  (primary encounter diagnosis)  Comment: weight down about 40# from peak.  Will contonue current dose of tirzepatide for now, but plenty of room to increase if needed  Plan: PRIMARY CARE FOLLOW-UP SCHEDULING              RTC in     Adrian Rivera MD     The longitudinal plan of care for the diagnosis(es)/condition(s) as documented were addressed during this visit. Due to the added complexity in care, I will continue to support patient in the subsequent management and with ongoing continuity of care.      Parth Rosales is a 63 year old, presenting for the following health issues:  Follow Up (Weight )        6/24/2025     2:10 PM   Additional Questions   Roomed by Mirtha LAU         6/24/2025     2:10 PM   Patient Reported Additional Medications   Patient reports taking the following new medications n/a     History of Present Illness       Reason for visit:  Weight loss med    He eats 2-3 servings of fruits and vegetables daily.He consumes 0 sweetened beverage(s) daily.He exercises with enough effort to increase his heart rate 30 to 60 minutes per day.  He exercises with enough effort to increase his heart rate 4 days per week.   He is taking medications regularly.          Medication Followup of Mounjaro  Taking Medication as prescribed: yes  Side Effects:  None  Medication Helping Symptoms:  yes    Notes the first day or two after injection has nausea and a bit tired, but otherwise tolerating well.  Pleased with weight loss.  Because of cost and paying out of pocket ([employer]) just started at 5 mg.  Also working on improving his diet and eating a lot fewer carbs.  Does think he's still losing weight, but notes that it is punctuated.        Objective    /86   Pulse 77   Temp 98  F (36.7  C) (Oral)   Resp 18   Ht 1.753 m (5' 9\")   Wt 114.1 kg (251 lb 9.6 oz)   SpO2 95%   BMI 37.15 kg/m    Body mass index is 37.15 kg/m .  Physical Exam  Vitals and nursing " note reviewed.   HENT:      Head: Normocephalic.   Eyes:      Conjunctiva/sclera: Conjunctivae normal.   Cardiovascular:      Rate and Rhythm: Normal rate and regular rhythm.      Heart sounds: Normal heart sounds.   Pulmonary:      Effort: Pulmonary effort is normal.      Breath sounds: Normal breath sounds.   Skin:     General: Skin is warm and dry.   Neurological:      General: No focal deficit present.      Mental Status: He is alert and oriented to person, place, and time.   Psychiatric:         Mood and Affect: Mood normal.         Behavior: Behavior normal.            Signed Electronically by: Adrian Rivera MD     31 yo male with no PMH presents to the ER for swelling to right side of face and a localized swollen rash to the forehead since yesterday. States he noticed some forehead discomfort yesterday which then formed a small circular maculopapular rash about 2.5 cm in diameter. Pt then noticed  today redness and swelling to the lateral aspect of right face, anterior to the right ear area. Denies dental pain, no gum tenderness or swelling, no throat pain or itching, no SOB/CP/abdomen pain/N/V/D/Fever/chills. No rash to any other part of his body. Denies itching. No ear pain, no visual complaints, no neck pain/stiffness/numbness/weakness/back pain/leg swelling/wt loss. On exam pt has +small red maculopapular rash to the forehead (contact dermatitis?, allergic reaction?, ) and swelling/redness/pain to the right lateral face near parotid gland area. Pt eyes are clear, TMs clear, oropharynx clear no swelling or redness, rash to forehead, swelling to right cheek as described, no dental abscess or gingival edema/redness, no tenderness to percussion of the tooth, no cervical lymphadenopathy, no thyroid mass, Lungs ctab, heart regular s1s2, Abdomen soft nt/nd +BS, no masses, Ext no edema or calf tenderness, Neuro intact   Check cbc, check amylase, if neg, dc with referral to derm, take benadryl prn, and hydrocortisone cream for forehead rash  ALL: nkda  PMH denies  Meds denies  SH denies  PMD doesn't know name

## 2025-06-25 NOTE — CHART NOTE - NSCHARTNOTEFT_GEN_A_CORE
[facility] MRN [id number] / PT & EYE CLINIC / Pt already has established Ophthalmologist. Pt wants to f/u w PT. Sent to [facility] 6/18 - [facility] / [facility] script 6/19 - [facility] / Per marge [facility] declined services 6/20 - [facility]L    SPECIALTY: physical therapy

## 2025-07-08 ENCOUNTER — APPOINTMENT (OUTPATIENT)
Dept: CARDIOLOGY | Facility: CLINIC | Age: 35
End: 2025-07-08

## 2025-09-03 ENCOUNTER — APPOINTMENT (OUTPATIENT)
Dept: CARDIOLOGY | Facility: CLINIC | Age: 35
End: 2025-09-03

## 2025-09-12 ENCOUNTER — APPOINTMENT (OUTPATIENT)
Dept: CARDIOLOGY | Facility: CLINIC | Age: 35
End: 2025-09-12
Payer: COMMERCIAL

## 2025-09-12 PROCEDURE — 93306 TTE W/DOPPLER COMPLETE: CPT

## 2025-09-15 ENCOUNTER — APPOINTMENT (OUTPATIENT)
Dept: CARDIOLOGY | Facility: CLINIC | Age: 35
End: 2025-09-15
Payer: COMMERCIAL

## 2025-09-15 VITALS
HEART RATE: 71 BPM | BODY MASS INDEX: 45.04 KG/M2 | WEIGHT: 305 LBS | DIASTOLIC BLOOD PRESSURE: 78 MMHG | SYSTOLIC BLOOD PRESSURE: 112 MMHG

## 2025-09-15 DIAGNOSIS — I42.8 OTHER CARDIOMYOPATHIES: ICD-10-CM

## 2025-09-15 DIAGNOSIS — I48.92 UNSPECIFIED ATRIAL FLUTTER: ICD-10-CM

## 2025-09-15 DIAGNOSIS — I48.19 OTHER PERSISTENT ATRIAL FIBRILLATION: ICD-10-CM

## 2025-09-15 PROCEDURE — 99213 OFFICE O/P EST LOW 20 MIN: CPT

## 2025-09-15 PROCEDURE — 93000 ELECTROCARDIOGRAM COMPLETE: CPT
